# Patient Record
Sex: FEMALE | Race: BLACK OR AFRICAN AMERICAN | NOT HISPANIC OR LATINO
[De-identification: names, ages, dates, MRNs, and addresses within clinical notes are randomized per-mention and may not be internally consistent; named-entity substitution may affect disease eponyms.]

---

## 2018-01-31 PROBLEM — Z00.00 ENCOUNTER FOR PREVENTIVE HEALTH EXAMINATION: Status: ACTIVE | Noted: 2018-01-31

## 2018-02-08 ENCOUNTER — APPOINTMENT (OUTPATIENT)
Dept: GYNECOLOGIC ONCOLOGY | Facility: CLINIC | Age: 53
End: 2018-02-08

## 2018-02-22 ENCOUNTER — APPOINTMENT (OUTPATIENT)
Dept: GYNECOLOGIC ONCOLOGY | Facility: CLINIC | Age: 53
End: 2018-02-22

## 2018-03-12 ENCOUNTER — APPOINTMENT (OUTPATIENT)
Dept: GYNECOLOGIC ONCOLOGY | Facility: CLINIC | Age: 53
End: 2018-03-12
Payer: COMMERCIAL

## 2018-03-12 VITALS
HEART RATE: 80 BPM | SYSTOLIC BLOOD PRESSURE: 135 MMHG | DIASTOLIC BLOOD PRESSURE: 83 MMHG | BODY MASS INDEX: 32.39 KG/M2 | HEIGHT: 62 IN | WEIGHT: 176 LBS

## 2018-03-12 DIAGNOSIS — N83.201 UNSPECIFIED OVARIAN CYST, RIGHT SIDE: ICD-10-CM

## 2018-03-12 DIAGNOSIS — N92.0 EXCESSIVE AND FREQUENT MENSTRUATION WITH REGULAR CYCLE: ICD-10-CM

## 2018-03-12 DIAGNOSIS — Z80.42 FAMILY HISTORY OF MALIGNANT NEOPLASM OF PROSTATE: ICD-10-CM

## 2018-03-12 PROCEDURE — 99244 OFF/OP CNSLTJ NEW/EST MOD 40: CPT

## 2018-03-12 RX ORDER — LOSARTAN POTASSIUM AND HYDROCHLOROTHIAZIDE 12.5; 1 MG/1; MG/1
TABLET ORAL
Refills: 0 | Status: ACTIVE | COMMUNITY

## 2018-03-12 RX ORDER — FOLIC/MVI THER-MIN/LYCOP/LUT 1.25-2.5MG
TABLET ORAL
Refills: 0 | Status: ACTIVE | COMMUNITY

## 2018-03-12 RX ORDER — CARVEDILOL 3.12 MG/1
TABLET, FILM COATED ORAL
Refills: 0 | Status: ACTIVE | COMMUNITY

## 2018-06-21 ENCOUNTER — APPOINTMENT (OUTPATIENT)
Dept: GYNECOLOGIC ONCOLOGY | Facility: CLINIC | Age: 53
End: 2018-06-21

## 2018-11-13 ENCOUNTER — EMERGENCY (EMERGENCY)
Facility: HOSPITAL | Age: 53
LOS: 1 days | End: 2018-11-13
Attending: EMERGENCY MEDICINE
Payer: COMMERCIAL

## 2018-11-13 VITALS
TEMPERATURE: 99 F | DIASTOLIC BLOOD PRESSURE: 107 MMHG | HEIGHT: 62 IN | OXYGEN SATURATION: 97 % | SYSTOLIC BLOOD PRESSURE: 184 MMHG | HEART RATE: 83 BPM | WEIGHT: 175.05 LBS | RESPIRATION RATE: 20 BRPM

## 2018-11-13 VITALS
HEART RATE: 93 BPM | RESPIRATION RATE: 18 BRPM | DIASTOLIC BLOOD PRESSURE: 69 MMHG | TEMPERATURE: 98 F | SYSTOLIC BLOOD PRESSURE: 122 MMHG | OXYGEN SATURATION: 99 %

## 2018-11-13 LAB
ALBUMIN SERPL ELPH-MCNC: 3.8 G/DL — SIGNIFICANT CHANGE UP (ref 3.3–5)
ALP SERPL-CCNC: 102 U/L — SIGNIFICANT CHANGE UP (ref 40–120)
ALT FLD-CCNC: 11 U/L — SIGNIFICANT CHANGE UP (ref 10–45)
ANION GAP SERPL CALC-SCNC: 13 MMOL/L — SIGNIFICANT CHANGE UP (ref 5–17)
AST SERPL-CCNC: 17 U/L — SIGNIFICANT CHANGE UP (ref 10–40)
BASOPHILS # BLD AUTO: 0 K/UL — SIGNIFICANT CHANGE UP (ref 0–0.2)
BASOPHILS NFR BLD AUTO: 0.4 % — SIGNIFICANT CHANGE UP (ref 0–2)
BILIRUB SERPL-MCNC: 0.2 MG/DL — SIGNIFICANT CHANGE UP (ref 0.2–1.2)
BUN SERPL-MCNC: 8 MG/DL — SIGNIFICANT CHANGE UP (ref 7–23)
CALCIUM SERPL-MCNC: 9.4 MG/DL — SIGNIFICANT CHANGE UP (ref 8.4–10.5)
CHLORIDE SERPL-SCNC: 101 MMOL/L — SIGNIFICANT CHANGE UP (ref 96–108)
CO2 SERPL-SCNC: 26 MMOL/L — SIGNIFICANT CHANGE UP (ref 22–31)
CREAT SERPL-MCNC: 0.58 MG/DL — SIGNIFICANT CHANGE UP (ref 0.5–1.3)
EOSINOPHIL # BLD AUTO: 0.1 K/UL — SIGNIFICANT CHANGE UP (ref 0–0.5)
EOSINOPHIL NFR BLD AUTO: 1.6 % — SIGNIFICANT CHANGE UP (ref 0–6)
GLUCOSE SERPL-MCNC: 146 MG/DL — HIGH (ref 70–99)
HCT VFR BLD CALC: 30.5 % — LOW (ref 34.5–45)
HGB BLD-MCNC: 9.7 G/DL — LOW (ref 11.5–15.5)
LYMPHOCYTES # BLD AUTO: 1.4 K/UL — SIGNIFICANT CHANGE UP (ref 1–3.3)
LYMPHOCYTES # BLD AUTO: 32.7 % — SIGNIFICANT CHANGE UP (ref 13–44)
MCHC RBC-ENTMCNC: 26.4 PG — LOW (ref 27–34)
MCHC RBC-ENTMCNC: 31.8 GM/DL — LOW (ref 32–36)
MCV RBC AUTO: 83 FL — SIGNIFICANT CHANGE UP (ref 80–100)
MONOCYTES # BLD AUTO: 0.5 K/UL — SIGNIFICANT CHANGE UP (ref 0–0.9)
MONOCYTES NFR BLD AUTO: 12.1 % — SIGNIFICANT CHANGE UP (ref 2–14)
NEUTROPHILS # BLD AUTO: 2.3 K/UL — SIGNIFICANT CHANGE UP (ref 1.8–7.4)
NEUTROPHILS NFR BLD AUTO: 53.3 % — SIGNIFICANT CHANGE UP (ref 43–77)
PLATELET # BLD AUTO: 393 K/UL — SIGNIFICANT CHANGE UP (ref 150–400)
POTASSIUM SERPL-MCNC: 3.4 MMOL/L — LOW (ref 3.5–5.3)
POTASSIUM SERPL-SCNC: 3.4 MMOL/L — LOW (ref 3.5–5.3)
PROT SERPL-MCNC: 7.8 G/DL — SIGNIFICANT CHANGE UP (ref 6–8.3)
RBC # BLD: 3.67 M/UL — LOW (ref 3.8–5.2)
RBC # FLD: 14.5 % — SIGNIFICANT CHANGE UP (ref 10.3–14.5)
SODIUM SERPL-SCNC: 140 MMOL/L — SIGNIFICANT CHANGE UP (ref 135–145)
TROPONIN T, HIGH SENSITIVITY RESULT: <6 NG/L — SIGNIFICANT CHANGE UP (ref 0–51)
WBC # BLD: 4.3 K/UL — SIGNIFICANT CHANGE UP (ref 3.8–10.5)
WBC # FLD AUTO: 4.3 K/UL — SIGNIFICANT CHANGE UP (ref 3.8–10.5)

## 2018-11-13 PROCEDURE — 99284 EMERGENCY DEPT VISIT MOD MDM: CPT | Mod: 25

## 2018-11-13 PROCEDURE — 71046 X-RAY EXAM CHEST 2 VIEWS: CPT

## 2018-11-13 PROCEDURE — 71046 X-RAY EXAM CHEST 2 VIEWS: CPT | Mod: 26

## 2018-11-13 PROCEDURE — 93005 ELECTROCARDIOGRAM TRACING: CPT

## 2018-11-13 PROCEDURE — 70450 CT HEAD/BRAIN W/O DYE: CPT | Mod: 26

## 2018-11-13 PROCEDURE — 80053 COMPREHEN METABOLIC PANEL: CPT

## 2018-11-13 PROCEDURE — 84484 ASSAY OF TROPONIN QUANT: CPT

## 2018-11-13 PROCEDURE — 70450 CT HEAD/BRAIN W/O DYE: CPT

## 2018-11-13 PROCEDURE — 96374 THER/PROPH/DIAG INJ IV PUSH: CPT

## 2018-11-13 PROCEDURE — 85027 COMPLETE CBC AUTOMATED: CPT

## 2018-11-13 RX ORDER — ACETAMINOPHEN 500 MG
975 TABLET ORAL ONCE
Qty: 0 | Refills: 0 | Status: COMPLETED | OUTPATIENT
Start: 2018-11-13 | End: 2018-11-13

## 2018-11-13 RX ORDER — METOCLOPRAMIDE HCL 10 MG
10 TABLET ORAL ONCE
Qty: 0 | Refills: 0 | Status: COMPLETED | OUTPATIENT
Start: 2018-11-13 | End: 2018-11-13

## 2018-11-13 RX ORDER — DIPHENHYDRAMINE HCL 50 MG
25 CAPSULE ORAL ONCE
Qty: 0 | Refills: 0 | Status: COMPLETED | OUTPATIENT
Start: 2018-11-13 | End: 2018-11-13

## 2018-11-13 RX ORDER — SODIUM CHLORIDE 9 MG/ML
1000 INJECTION INTRAMUSCULAR; INTRAVENOUS; SUBCUTANEOUS ONCE
Qty: 0 | Refills: 0 | Status: COMPLETED | OUTPATIENT
Start: 2018-11-13 | End: 2018-11-13

## 2018-11-13 RX ADMIN — Medication 975 MILLIGRAM(S): at 04:07

## 2018-11-13 RX ADMIN — Medication 25 MILLIGRAM(S): at 04:07

## 2018-11-13 RX ADMIN — Medication 10 MILLIGRAM(S): at 04:06

## 2018-11-13 RX ADMIN — SODIUM CHLORIDE 2000 MILLILITER(S): 9 INJECTION INTRAMUSCULAR; INTRAVENOUS; SUBCUTANEOUS at 03:40

## 2018-11-13 NOTE — ED PROVIDER NOTE - CARE PLAN
Principal Discharge DX:	Headache  Assessment and plan of treatment:	1) You were here for headache.    2) Take tylenol for your pain.    3) Follow up with your primary doctor regarding your anemia and for further evaluation and to answer any questions you have.    4) Return to the emergency department if you experience worsening symptoms, pain, fever, chills, nausea, vomiting or other concerning symptoms'.

## 2018-11-13 NOTE — ED PROVIDER NOTE - ATTENDING CONTRIBUTION TO CARE
53 yof pmhx htn on carvedilol and arb/hctz, presents w gradual onset headache over last 3 days. not assoc w any vision changes, focal weaknesses, neck pain or fever. states ha is diffuse. no recent dietary indescretion or changes in her baseline medications.     ROS:   constitutional - no fever, no chills  eyes - no visual changes, no redness  eent - no sore throat, no nasal congestion  cvs - no chest pain, no leg swelling  resp - no shortness of breath, no cough  gi - no abdominal pain, no vomiting, no diarrhea  gu - no dysuria, no hematuria  msk - no acute back pain, no joint swelling  skin - no rashes, no jaundice  neuro - + headache, no focal weakness  psych - no acute mental health issue     Physical Exam:   constitutional - well appearing, awake and alert, oriented x3  head - no external evidence of trauma  cvs - rrr, no murmurs, no peripheral edema  resp - breath sounds clear and equal bilat  gi - abdomen soft and nontender, no rigidity, guarding or rebound, bowel sounds present  msk - moving all extremities spontaneously  neuro - alert and oriented x3, no focal deficits, CNs 2-12 grossly intact, strength 5/5 in all ext, gait stable, sensation to face intact, no meningismus   skin- no jaundice, warm and dry  psych - mood and affect wnl, no apparent risk to self or others     likely tension sarmiento. NYASIA Nielsen MD 53 yof pmhx htn on carvedilol and arb/hctz, presents w gradual onset headache over last 3 days. not assoc w any vision changes, focal weaknesses, neck pain or fever. states ha is diffuse. no recent dietary indescretion or changes in her baseline medications.     ROS:   constitutional - no fever, no chills  eyes - no visual changes, no redness  eent - no sore throat, no nasal congestion  cvs - no chest pain, no leg swelling  resp - no shortness of breath, no cough  gi - no abdominal pain, no vomiting, no diarrhea  gu - no dysuria, no hematuria  msk - no acute back pain, no joint swelling  skin - no rashes, no jaundice  neuro - + headache, no focal weakness  psych - no acute mental health issue     Physical Exam:   constitutional - well appearing, awake and alert, oriented x3  head - no external evidence of trauma  cvs - rrr, no murmurs, no peripheral edema  resp - breath sounds clear and equal bilat  gi - abdomen soft and nontender, no rigidity, guarding or rebound, bowel sounds present  msk - moving all extremities spontaneously  neuro - alert and oriented x3, no focal deficits, CNs 2-12 grossly intact, strength 5/5 in all ext, gait stable, sensation to face intact, no meningismus   skin- no jaundice, warm and dry  psych - mood and affect wnl, no apparent risk to self or others     likely tension ha. pt feeling better, ct neg. additional verbal instructions regarding diagnosis, return precautions and follow up plan given to pt and/or family. NYASIA Nielsen MD

## 2018-11-13 NOTE — ED PROVIDER NOTE - OBJECTIVE STATEMENT
53M with past medical history Hypertension not on medications presents with 2d h/o migratory throbbing headache a/w nausea and sensation of palpitations in heart.  Denies any inciting incident, no aggravating or alleviating factors and does not have h/o headaches.  Has been taking motrin for the headache which helps.  Denies focal weakness or loss of sensation, falls, vomiting, vision change, diaphoresis, arm pain, jaw pain, lightheadedness, fever, cough, dysuria, diarrhea, recent travel, calf pain.

## 2018-11-13 NOTE — ED ADULT NURSE NOTE - CHPI ED NUR SYMPTOMS NEG
no change in level of consciousness/no blurred vision/no confusion/no nausea/no numbness/no weakness/no fever/no loss of consciousness/no vomiting/no dizziness

## 2018-11-13 NOTE — ED PROVIDER NOTE - MEDICAL DECISION MAKING DETAILS
Low risk chest pain for 2d, will do single trop.  EKG to evaluation for arrythmia.  Will give headache cocktail and check labs.  Will obtain ct head given no h/o ha's and new onset ha's for 3d with nausea.

## 2018-11-13 NOTE — ED PROVIDER NOTE - NS ED ROS FT
Constitutional: no fever  Eyes: no conjunctivitis  Ears: no ear pain   Nose: no nasal congestion, Mouth/Throat: no throat pain, Neck: no stiffness  Cardiovascular: As noted in hpi   Chest: no cough  Gastrointestinal: no abdominal pain, no vomiting and diarrhea  MSK: no joint pain  : no dysuria  Skin: no rash  Neuro: As noted in hpi   All other review of systems negative except as noted in HPI

## 2018-11-13 NOTE — ED PROVIDER NOTE - PROGRESS NOTE DETAILS
ct negative, xray negative, ekg unremarkable, headache improved.  headache likely tension in nature.  will discharge with fu with her primary medical doctor for anemia.

## 2020-09-12 ENCOUNTER — EMERGENCY (EMERGENCY)
Facility: HOSPITAL | Age: 55
LOS: 1 days | Discharge: HOME | End: 2020-09-12
Attending: EMERGENCY MEDICINE
Payer: COMMERCIAL

## 2020-09-12 VITALS
SYSTOLIC BLOOD PRESSURE: 167 MMHG | HEIGHT: 62 IN | TEMPERATURE: 99 F | DIASTOLIC BLOOD PRESSURE: 83 MMHG | HEART RATE: 91 BPM | RESPIRATION RATE: 17 BRPM | WEIGHT: 169.98 LBS | OXYGEN SATURATION: 98 %

## 2020-09-12 VITALS
DIASTOLIC BLOOD PRESSURE: 76 MMHG | RESPIRATION RATE: 16 BRPM | TEMPERATURE: 98 F | OXYGEN SATURATION: 100 % | HEART RATE: 82 BPM | SYSTOLIC BLOOD PRESSURE: 129 MMHG

## 2020-09-12 LAB
ALBUMIN SERPL ELPH-MCNC: 4.1 G/DL — SIGNIFICANT CHANGE UP (ref 3.3–5)
ALP SERPL-CCNC: 113 U/L — SIGNIFICANT CHANGE UP (ref 40–120)
ALT FLD-CCNC: 19 U/L — SIGNIFICANT CHANGE UP (ref 10–45)
ANION GAP SERPL CALC-SCNC: 12 MMOL/L — SIGNIFICANT CHANGE UP (ref 5–17)
APTT BLD: 37.8 SEC — HIGH (ref 27.5–35.5)
AST SERPL-CCNC: 22 U/L — SIGNIFICANT CHANGE UP (ref 10–40)
BILIRUB SERPL-MCNC: 0.6 MG/DL — SIGNIFICANT CHANGE UP (ref 0.2–1.2)
BUN SERPL-MCNC: 7 MG/DL — SIGNIFICANT CHANGE UP (ref 7–23)
CALCIUM SERPL-MCNC: 9.9 MG/DL — SIGNIFICANT CHANGE UP (ref 8.4–10.5)
CHLORIDE SERPL-SCNC: 99 MMOL/L — SIGNIFICANT CHANGE UP (ref 96–108)
CO2 SERPL-SCNC: 28 MMOL/L — SIGNIFICANT CHANGE UP (ref 22–31)
CREAT SERPL-MCNC: 0.57 MG/DL — SIGNIFICANT CHANGE UP (ref 0.5–1.3)
GLUCOSE SERPL-MCNC: 109 MG/DL — HIGH (ref 70–99)
HCG SERPL-ACNC: 2.2 MIU/ML — SIGNIFICANT CHANGE UP
HCT VFR BLD CALC: 41.7 % — SIGNIFICANT CHANGE UP (ref 34.5–45)
HGB BLD-MCNC: 13.1 G/DL — SIGNIFICANT CHANGE UP (ref 11.5–15.5)
HIV 1 & 2 AB SERPL IA.RAPID: SIGNIFICANT CHANGE UP
INR BLD: 1.12 RATIO — SIGNIFICANT CHANGE UP (ref 0.88–1.16)
MCHC RBC-ENTMCNC: 27 PG — SIGNIFICANT CHANGE UP (ref 27–34)
MCHC RBC-ENTMCNC: 31.4 GM/DL — LOW (ref 32–36)
MCV RBC AUTO: 86 FL — SIGNIFICANT CHANGE UP (ref 80–100)
NRBC # BLD: 0 /100 WBCS — SIGNIFICANT CHANGE UP (ref 0–0)
PLATELET # BLD AUTO: 250 K/UL — SIGNIFICANT CHANGE UP (ref 150–400)
POTASSIUM SERPL-MCNC: 3.4 MMOL/L — LOW (ref 3.5–5.3)
POTASSIUM SERPL-SCNC: 3.4 MMOL/L — LOW (ref 3.5–5.3)
PROT SERPL-MCNC: 8.6 G/DL — HIGH (ref 6–8.3)
PROTHROM AB SERPL-ACNC: 13.2 SEC — SIGNIFICANT CHANGE UP (ref 10.6–13.6)
RBC # BLD: 4.85 M/UL — SIGNIFICANT CHANGE UP (ref 3.8–5.2)
RBC # FLD: 14.1 % — SIGNIFICANT CHANGE UP (ref 10.3–14.5)
SODIUM SERPL-SCNC: 139 MMOL/L — SIGNIFICANT CHANGE UP (ref 135–145)
WBC # BLD: 2.89 K/UL — LOW (ref 3.8–10.5)
WBC # FLD AUTO: 2.89 K/UL — LOW (ref 3.8–10.5)

## 2020-09-12 PROCEDURE — 80053 COMPREHEN METABOLIC PANEL: CPT

## 2020-09-12 PROCEDURE — 85027 COMPLETE CBC AUTOMATED: CPT

## 2020-09-12 PROCEDURE — 99285 EMERGENCY DEPT VISIT HI MDM: CPT

## 2020-09-12 PROCEDURE — 70487 CT MAXILLOFACIAL W/DYE: CPT

## 2020-09-12 PROCEDURE — 70487 CT MAXILLOFACIAL W/DYE: CPT | Mod: 26

## 2020-09-12 PROCEDURE — 86703 HIV-1/HIV-2 1 RESULT ANTBDY: CPT

## 2020-09-12 PROCEDURE — 85730 THROMBOPLASTIN TIME PARTIAL: CPT

## 2020-09-12 PROCEDURE — 84702 CHORIONIC GONADOTROPIN TEST: CPT

## 2020-09-12 PROCEDURE — 99284 EMERGENCY DEPT VISIT MOD MDM: CPT | Mod: 25

## 2020-09-12 PROCEDURE — 85610 PROTHROMBIN TIME: CPT

## 2020-09-12 RX ADMIN — Medication 1 TABLET(S): at 17:29

## 2020-09-12 NOTE — ED PROVIDER NOTE - OBJECTIVE STATEMENT
54 yoM, PMHx of HTN, controlled, presenting with 2-3 days of L sided facial numbness. Denies any fever, chills, trauma. No pain. No motor changes, droop, changes in smile, voice, swallowing. No visual changes. No recent illness. Denies any odontogenic complaint. Never had this sx before. Denies any insect bites. Sx constant, slightly worse. Sees PCP regularly. Denies any smoking, drinking, drug use.

## 2020-09-12 NOTE — ED PROVIDER NOTE - NSFOLLOWUPINSTRUCTIONS_ED_ALL_ED_FT
You will be treated for an early infection.     Take augmentin as prescribed.     Follow up with your doctor THIS WEEK.    Please return to ER for new or concerning symptoms.

## 2020-09-12 NOTE — ED PROVIDER NOTE - PATIENT PORTAL LINK FT
You can access the FollowMyHealth Patient Portal offered by [facility] by registering at the following website: [email or web address]. By joining [employer]’s FollowMyHealth portal, you will also be able to view your health information using other applications (apps) compatible with our system.

## 2020-09-12 NOTE — ED ADULT NURSE NOTE - OBJECTIVE STATEMENT
55 yo f pmhx of HTN, presents to the ED with c/o numbness to the left upper cheek which started 3 days ago. PT reports the numbness started three days ago, has progressively gotten worse. Pt reports she noticed also a subtle change, reports slight swelling. Pt AAOx4, no confusion, neurologically intact, PERRLA 3mm, no blurry vision, no facial droop, reports slight decreased sensation of the left cheek,  NAD, resp nonlabored, abdomen soft nontender nondistended, pt following commands, Rigoberto independently, 5/5 equal strength to extremities x 4, no sensory deficits, strong peripheral pulses x 4, cap refill < 2 seconds, skin warm and dry, ambulating independently, steady gait noted. Pt denies headache, dizziness, chest pain, palpitations, cough, SOB, abdominal pain, n/v/d, urinary symptoms, fevers, chills, weakness at this time. Reports no recent bug bites, change in detergent, or medications.

## 2020-09-12 NOTE — ED ADULT NURSE NOTE - CHIEF COMPLAINT QUOTE
L sided facial numbness x2-3 days. Endorses headache. No changes in speech. Strength intact, no change in gait.  PMHx HTN

## 2020-09-12 NOTE — ED PROVIDER NOTE - ATTENDING CONTRIBUTION TO CARE
55 y/o m with pmhx HTN, presents for left side of her face that began on Wednesday and has persisted. Today the numbness sensation increased on her cheek and came to ED for eval. no associated headache. no facial motor weakness. no change in vision or hearing. no difficulty with speech. no pain on ear.   Gen.  no acute distress  HEENT:  perrl eomi, no skin changes. no ttp. tm clear b/l, pupils 2 mm reactive to light b/l. tongue no jamar or erythema. sinus no ttp.   Lungs:  b/l b bs  CVS: S1S2   Abd;  soft non tender no distention  Ext: no pitting edema or erythema  Neuro: aaox3 no focal deficits  MSK: strength 5/5 b/l upper and lower ext.

## 2020-09-12 NOTE — ED PROVIDER NOTE - PROGRESS NOTE DETAILS
Haverty, PGY3- mild swelling on CT, no evidence of abscess/cellulitis. Treat for early soft tissue infection. Augmentin ERX sent. Pt understands and amenable to plan.

## 2020-09-12 NOTE — ED PROVIDER NOTE - PHYSICAL EXAMINATION
General: alert, conversant, looks well, vitals reassuring  Head: atraumatic, normocephalic  Eyes: PERRL, EOMI, no scleral icterus  ENT: no epistaxis, moist mucous membranes, normal phonation, airway patent, very mild facial edema L side around, no erythema, skin intact, no induration/fluctuance, no dental/oral abnormalities/caries/lesions seen  Neck: full ROM, no midline ttp  CV: RRR, no murmurs, HDS  Pulm: lungs CTA b/l, no wheezing, no respiratory distress  GI: abd soft, non tender, no guarding/rebound/masses  Back: normal ROM, no midline ttp, no signs of trauma  Extremities: normal ROM, joints stable, distal pulses intact, no edema  Neuro: alert, oriented x3, moving all extremities, interactive. CN 2-12 intact, PERRL, 5/5 strength in extremities, ambulatory, normal speech/memory, no nystagmus, no facial droop, sensation to affected area intact, but subjective decreased sensation  Derm: warm, dry, normal color, no rash/wounds

## 2021-03-22 ENCOUNTER — OUTPATIENT (OUTPATIENT)
Dept: OUTPATIENT SERVICES | Facility: HOSPITAL | Age: 56
LOS: 1 days | End: 2021-03-22
Payer: COMMERCIAL

## 2021-03-22 VITALS
OXYGEN SATURATION: 100 % | DIASTOLIC BLOOD PRESSURE: 98 MMHG | WEIGHT: 173.06 LBS | SYSTOLIC BLOOD PRESSURE: 150 MMHG | HEIGHT: 62 IN | RESPIRATION RATE: 16 BRPM | HEART RATE: 78 BPM | TEMPERATURE: 98 F

## 2021-03-22 DIAGNOSIS — N84.0 POLYP OF CORPUS UTERI: ICD-10-CM

## 2021-03-22 DIAGNOSIS — Z01.818 ENCOUNTER FOR OTHER PREPROCEDURAL EXAMINATION: ICD-10-CM

## 2021-03-22 DIAGNOSIS — I10 ESSENTIAL (PRIMARY) HYPERTENSION: ICD-10-CM

## 2021-03-22 DIAGNOSIS — Z29.9 ENCOUNTER FOR PROPHYLACTIC MEASURES, UNSPECIFIED: ICD-10-CM

## 2021-03-22 DIAGNOSIS — Z98.891 HISTORY OF UTERINE SCAR FROM PREVIOUS SURGERY: Chronic | ICD-10-CM

## 2021-03-22 LAB
ANION GAP SERPL CALC-SCNC: 5 MMOL/L — SIGNIFICANT CHANGE UP (ref 5–17)
APTT BLD: 40 SEC — HIGH (ref 27.5–35.5)
BLD GP AB SCN SERPL QL: SIGNIFICANT CHANGE UP
BUN SERPL-MCNC: 9 MG/DL — SIGNIFICANT CHANGE UP (ref 7–18)
CALCIUM SERPL-MCNC: 9.1 MG/DL — SIGNIFICANT CHANGE UP (ref 8.4–10.5)
CHLORIDE SERPL-SCNC: 103 MMOL/L — SIGNIFICANT CHANGE UP (ref 96–108)
CO2 SERPL-SCNC: 32 MMOL/L — HIGH (ref 22–31)
CREAT SERPL-MCNC: 0.68 MG/DL — SIGNIFICANT CHANGE UP (ref 0.5–1.3)
GLUCOSE SERPL-MCNC: 92 MG/DL — SIGNIFICANT CHANGE UP (ref 70–99)
HCT VFR BLD CALC: 38.2 % — SIGNIFICANT CHANGE UP (ref 34.5–45)
HGB BLD-MCNC: 11.8 G/DL — SIGNIFICANT CHANGE UP (ref 11.5–15.5)
INR BLD: 1.15 RATIO — SIGNIFICANT CHANGE UP (ref 0.88–1.16)
MCHC RBC-ENTMCNC: 26.6 PG — LOW (ref 27–34)
MCHC RBC-ENTMCNC: 30.9 GM/DL — LOW (ref 32–36)
MCV RBC AUTO: 86.2 FL — SIGNIFICANT CHANGE UP (ref 80–100)
NRBC # BLD: 0 /100 WBCS — SIGNIFICANT CHANGE UP (ref 0–0)
PLATELET # BLD AUTO: 256 K/UL — SIGNIFICANT CHANGE UP (ref 150–400)
POTASSIUM SERPL-MCNC: 3.5 MMOL/L — SIGNIFICANT CHANGE UP (ref 3.5–5.3)
POTASSIUM SERPL-SCNC: 3.5 MMOL/L — SIGNIFICANT CHANGE UP (ref 3.5–5.3)
PROTHROM AB SERPL-ACNC: 13.6 SEC — SIGNIFICANT CHANGE UP (ref 10.6–13.6)
RBC # BLD: 4.43 M/UL — SIGNIFICANT CHANGE UP (ref 3.8–5.2)
RBC # FLD: 14.3 % — SIGNIFICANT CHANGE UP (ref 10.3–14.5)
SODIUM SERPL-SCNC: 140 MMOL/L — SIGNIFICANT CHANGE UP (ref 135–145)
WBC # BLD: 2.3 K/UL — LOW (ref 3.8–10.5)
WBC # FLD AUTO: 2.3 K/UL — LOW (ref 3.8–10.5)

## 2021-03-22 PROCEDURE — 93005 ELECTROCARDIOGRAM TRACING: CPT

## 2021-03-22 PROCEDURE — G0463: CPT

## 2021-03-22 NOTE — H&P PST ADULT - HISTORY OF PRESENT ILLNESS
55 years old female presented to [facility] for evaluation before surgery, patient is diagnosed with polyp of corpus uteri and is scheduled for dilation and curettage with hysteroscopy on 4/2/2021.

## 2021-03-22 NOTE — H&P PST ADULT - NEGATIVE NEUROLOGICAL SYMPTOMS
no transient paralysis/no weakness/no paresthesias/no generalized seizures/no tremors/no vertigo/no loss of sensation/no difficulty walking/no headache/no loss of consciousness

## 2021-03-22 NOTE — H&P PST ADULT - NEGATIVE GENERAL GENITOURINARY SYMPTOMS
no hematuria/no renal colic/no flank pain L/no flank pain R/no incontinence/no dysuria/no urinary hesitancy/normal urinary frequency/no nocturia

## 2021-03-22 NOTE — H&P PST ADULT - BIRTH SEX
CERTIFICATE OF WORK    6/8/2020        Ariane Rodriguez   [facility] [id number]        This is to certify that Ariane Rodriguez has been under my care from 6/8/2020 at 6:50 PM and must wear walking boot while at work.      SIGNATURE:___________________________________________,     Fito Winn, [facility]  [address] [place] [address] [phone]       
Female

## 2021-03-22 NOTE — H&P PST ADULT - BP NONINVASIVE DIASTOLIC (MM HG)
Patient's wife calling requesting a call back to discuss the patient's procedure for 04/01/19. She states that she is pretty sure the patient is not going to be able to have that done then. She is wondering if the patient can have labs done, and if they look good, the patient can have another nasal swab. She states that she is shooting to have the procedure on 04/29/19.    Thank you   98

## 2021-03-22 NOTE — H&P PST ADULT - NSICDXPROBLEM_GEN_ALL_CORE_FT
PROBLEM DIAGNOSES  Problem: Hypertension  Assessment and Plan: Continue antihypertensive meds and take with sips of water on day of surgery.   Follow-up with PCP postop for management.      Problem: Polyp of corpus uteri  Assessment and Plan: Patient is scheduled for dilation and curettage with hysteroscopy on 4/2/2021.     Problem: Prophylactic measure  Assessment and Plan: Preoperative instructions discussed with pt and given to pt. Instructed pt that no one will be allowed to come to hospital with patient , to notify security on arrival to the lobby of the hospital that today is day of surgery. Patient notified that will need someone to come to the hospital to  after surgery, not to eat or drink anything after midnight the night before the surgery, to avoid NSAIDs such as Ibuprofen, Motrin, Aleve, Advil, naproxen before surgery, to take Tylenol if needed for pain if no contraindications from PCP, to report exposure to anyone with any contagious diseases including Covid-19 or if patient is exhibiting any symptoms of COVID-19. Chlorhexidine 4% soap given to pt. Instructed about use of Chlorhexidine 4% soap before surgery. Patient verbalized understanding of instructions given.

## 2021-03-24 PROBLEM — I10 ESSENTIAL (PRIMARY) HYPERTENSION: Chronic | Status: ACTIVE | Noted: 2021-03-22

## 2021-03-24 PROBLEM — N84.0 POLYP OF CORPUS UTERI: Chronic | Status: ACTIVE | Noted: 2021-03-22

## 2021-03-24 PROBLEM — N95.0 POSTMENOPAUSAL BLEEDING: Chronic | Status: ACTIVE | Noted: 2021-03-22

## 2021-03-29 DIAGNOSIS — Z01.818 ENCOUNTER FOR OTHER PREPROCEDURAL EXAMINATION: ICD-10-CM

## 2021-03-30 ENCOUNTER — APPOINTMENT (OUTPATIENT)
Dept: DISASTER EMERGENCY | Facility: CLINIC | Age: 56
End: 2021-03-30

## 2021-03-31 LAB — SARS-COV-2 N GENE NPH QL NAA+PROBE: NOT DETECTED

## 2021-04-01 ENCOUNTER — TRANSCRIPTION ENCOUNTER (OUTPATIENT)
Age: 56
End: 2021-04-01

## 2021-04-02 ENCOUNTER — RESULT REVIEW (OUTPATIENT)
Age: 56
End: 2021-04-02

## 2021-04-02 ENCOUNTER — OUTPATIENT (OUTPATIENT)
Dept: OUTPATIENT SERVICES | Facility: HOSPITAL | Age: 56
LOS: 1 days | End: 2021-04-02
Payer: COMMERCIAL

## 2021-04-02 VITALS
OXYGEN SATURATION: 97 % | DIASTOLIC BLOOD PRESSURE: 76 MMHG | TEMPERATURE: 98 F | SYSTOLIC BLOOD PRESSURE: 110 MMHG | HEART RATE: 69 BPM | RESPIRATION RATE: 17 BRPM

## 2021-04-02 VITALS
OXYGEN SATURATION: 98 % | TEMPERATURE: 99 F | SYSTOLIC BLOOD PRESSURE: 134 MMHG | WEIGHT: 173.06 LBS | DIASTOLIC BLOOD PRESSURE: 80 MMHG | RESPIRATION RATE: 16 BRPM | HEIGHT: 62 IN | HEART RATE: 94 BPM

## 2021-04-02 DIAGNOSIS — Z98.891 HISTORY OF UTERINE SCAR FROM PREVIOUS SURGERY: Chronic | ICD-10-CM

## 2021-04-02 DIAGNOSIS — N84.0 POLYP OF CORPUS UTERI: ICD-10-CM

## 2021-04-02 LAB
BLD GP AB SCN SERPL QL: SIGNIFICANT CHANGE UP
HCG UR QL: NEGATIVE — SIGNIFICANT CHANGE UP
HCT VFR BLD CALC: 38.3 % — SIGNIFICANT CHANGE UP (ref 34.5–45)
HGB BLD-MCNC: 12 G/DL — SIGNIFICANT CHANGE UP (ref 11.5–15.5)
MCHC RBC-ENTMCNC: 26.7 PG — LOW (ref 27–34)
MCHC RBC-ENTMCNC: 31.3 GM/DL — LOW (ref 32–36)
MCV RBC AUTO: 85.1 FL — SIGNIFICANT CHANGE UP (ref 80–100)
NRBC # BLD: 0 /100 WBCS — SIGNIFICANT CHANGE UP (ref 0–0)
PLATELET # BLD AUTO: 314 K/UL — SIGNIFICANT CHANGE UP (ref 150–400)
RBC # BLD: 4.5 M/UL — SIGNIFICANT CHANGE UP (ref 3.8–5.2)
RBC # FLD: 13.9 % — SIGNIFICANT CHANGE UP (ref 10.3–14.5)
WBC # BLD: 2.73 K/UL — LOW (ref 3.8–10.5)
WBC # FLD AUTO: 2.73 K/UL — LOW (ref 3.8–10.5)

## 2021-04-02 PROCEDURE — 86850 RBC ANTIBODY SCREEN: CPT

## 2021-04-02 PROCEDURE — 86900 BLOOD TYPING SEROLOGIC ABO: CPT

## 2021-04-02 PROCEDURE — 81025 URINE PREGNANCY TEST: CPT

## 2021-04-02 PROCEDURE — 88305 TISSUE EXAM BY PATHOLOGIST: CPT | Mod: 26

## 2021-04-02 PROCEDURE — 88305 TISSUE EXAM BY PATHOLOGIST: CPT

## 2021-04-02 PROCEDURE — 36415 COLL VENOUS BLD VENIPUNCTURE: CPT

## 2021-04-02 PROCEDURE — 58558 HYSTEROSCOPY BIOPSY: CPT

## 2021-04-02 PROCEDURE — 85027 COMPLETE CBC AUTOMATED: CPT

## 2021-04-02 PROCEDURE — 86901 BLOOD TYPING SEROLOGIC RH(D): CPT

## 2021-04-02 RX ORDER — ONDANSETRON 8 MG/1
4 TABLET, FILM COATED ORAL ONCE
Refills: 0 | Status: DISCONTINUED | OUTPATIENT
Start: 2021-04-02 | End: 2021-04-09

## 2021-04-02 RX ORDER — SODIUM CHLORIDE 9 MG/ML
3 INJECTION INTRAMUSCULAR; INTRAVENOUS; SUBCUTANEOUS EVERY 8 HOURS
Refills: 0 | Status: DISCONTINUED | OUTPATIENT
Start: 2021-04-02 | End: 2021-04-02

## 2021-04-02 RX ORDER — IBUPROFEN 200 MG
600 TABLET ORAL EVERY 6 HOURS
Refills: 0 | Status: DISCONTINUED | OUTPATIENT
Start: 2021-04-02 | End: 2021-04-09

## 2021-04-02 RX ORDER — CARVEDILOL PHOSPHATE 80 MG/1
1 CAPSULE, EXTENDED RELEASE ORAL
Qty: 0 | Refills: 0 | DISCHARGE

## 2021-04-02 RX ORDER — IBUPROFEN 200 MG
1 TABLET ORAL
Qty: 40 | Refills: 2
Start: 2021-04-02 | End: 2021-05-01

## 2021-04-02 RX ADMIN — SODIUM CHLORIDE 3 MILLILITER(S): 9 INJECTION INTRAMUSCULAR; INTRAVENOUS; SUBCUTANEOUS at 07:07

## 2021-04-02 NOTE — ASU DISCHARGE PLAN (ADULT/PEDIATRIC) - CARE PROVIDER_API CALL
Muna Hahn)  Obstetrics and Gynecology  [address] [place] [address]  Phone: [phone]  Fax: [phone]  Follow Up Time:

## 2021-04-02 NOTE — ASU DISCHARGE PLAN (ADULT/PEDIATRIC) - CALL YOUR DOCTOR IF YOU HAVE ANY OF THE FOLLOWING:
Bleeding that does not stop/Pain not relieved by Medications/Fever greater than (need to indicate Fahrenheit or Celsius)/Unable to urinate/Inability to tolerate liquids or foods/Increased irritability or sluggishness

## 2021-04-06 LAB — SURGICAL PATHOLOGY STUDY: SIGNIFICANT CHANGE UP

## 2021-10-02 ENCOUNTER — EMERGENCY (EMERGENCY)
Facility: HOSPITAL | Age: 56
LOS: 1 days | Discharge: HOME | End: 2021-10-02
Attending: EMERGENCY MEDICINE
Payer: COMMERCIAL

## 2021-10-02 VITALS
DIASTOLIC BLOOD PRESSURE: 96 MMHG | OXYGEN SATURATION: 97 % | SYSTOLIC BLOOD PRESSURE: 149 MMHG | TEMPERATURE: 98 F | RESPIRATION RATE: 18 BRPM | WEIGHT: 175.05 LBS | HEART RATE: 82 BPM | HEIGHT: 62 IN

## 2021-10-02 VITALS
DIASTOLIC BLOOD PRESSURE: 78 MMHG | OXYGEN SATURATION: 98 % | SYSTOLIC BLOOD PRESSURE: 134 MMHG | HEART RATE: 76 BPM | TEMPERATURE: 98 F | RESPIRATION RATE: 18 BRPM

## 2021-10-02 DIAGNOSIS — Z98.891 HISTORY OF UTERINE SCAR FROM PREVIOUS SURGERY: Chronic | ICD-10-CM

## 2021-10-02 LAB
APPEARANCE UR: CLEAR — SIGNIFICANT CHANGE UP
BACTERIA # UR AUTO: ABNORMAL
BILIRUB UR-MCNC: NEGATIVE — SIGNIFICANT CHANGE UP
COLOR SPEC: SIGNIFICANT CHANGE UP
DIFF PNL FLD: NEGATIVE — SIGNIFICANT CHANGE UP
EPI CELLS # UR: 8 /HPF — HIGH
GLUCOSE UR QL: NEGATIVE — SIGNIFICANT CHANGE UP
HYALINE CASTS # UR AUTO: 2 /LPF — SIGNIFICANT CHANGE UP (ref 0–2)
KETONES UR-MCNC: NEGATIVE — SIGNIFICANT CHANGE UP
LEUKOCYTE ESTERASE UR-ACNC: ABNORMAL
NITRITE UR-MCNC: NEGATIVE — SIGNIFICANT CHANGE UP
PH UR: 7 — SIGNIFICANT CHANGE UP (ref 5–8)
PROT UR-MCNC: NEGATIVE — SIGNIFICANT CHANGE UP
RBC CASTS # UR COMP ASSIST: 1 /HPF — SIGNIFICANT CHANGE UP (ref 0–4)
SP GR SPEC: 1.02 — SIGNIFICANT CHANGE UP (ref 1.01–1.02)
UROBILINOGEN FLD QL: ABNORMAL
WBC UR QL: 4 /HPF — SIGNIFICANT CHANGE UP (ref 0–5)

## 2021-10-02 PROCEDURE — 81001 URINALYSIS AUTO W/SCOPE: CPT

## 2021-10-02 PROCEDURE — 99283 EMERGENCY DEPT VISIT LOW MDM: CPT

## 2021-10-02 PROCEDURE — 87086 URINE CULTURE/COLONY COUNT: CPT

## 2021-10-02 PROCEDURE — 99284 EMERGENCY DEPT VISIT MOD MDM: CPT

## 2021-10-02 RX ORDER — LIDOCAINE 4 G/100G
1 CREAM TOPICAL ONCE
Refills: 0 | Status: COMPLETED | OUTPATIENT
Start: 2021-10-02 | End: 2021-10-02

## 2021-10-02 RX ORDER — IBUPROFEN 200 MG
600 TABLET ORAL ONCE
Refills: 0 | Status: COMPLETED | OUTPATIENT
Start: 2021-10-02 | End: 2021-10-02

## 2021-10-02 RX ADMIN — Medication 600 MILLIGRAM(S): at 20:01

## 2021-10-02 RX ADMIN — LIDOCAINE 1 PATCH: 4 CREAM TOPICAL at 20:01

## 2021-10-02 NOTE — ED PROVIDER NOTE - PATIENT PORTAL LINK FT
You can access the FollowMyHealth Patient Portal offered by [facility] by registering at the following website: [email or web address]. By joining [employer]’s FollowMyHealth portal, you will also be able to view your health information using other applications (apps) compatible with our system.

## 2021-10-02 NOTE — ED PROVIDER NOTE - ATTENDING CONTRIBUTION TO CARE
56 yo female with low back pain radiating to flank after sleeping in an "awkward" position per patient.  Pain worse with movement, improved with rest.  No dysuria, no hematuria.  UA unremarkable.  No CP/SOB.  Not dissection, not PE, not renal colic.  Very likely benign MSK injury vs atypical radiculopathy though I think the latter is less likely.  Otherwise quite well appearing, smiling, conversant.  Stable for outpatient follow-up.

## 2021-10-02 NOTE — ED PROVIDER NOTE - NSFOLLOWUPINSTRUCTIONS_ED_ALL_ED_FT
1. Stay hydrated. Recommend apply ice to your back in 20 minute intervals for the next few days     2. Avoid strenuous activity, twisting and heavy lifting    3. For residual pain, recommend taking over the counter Tylenol 1g every 8 hours alternated with Motrin 600 mg every 6hrs     4. Follow up with your Primary Care Doctor after discharge for reevaluation     5. Return to ED for worsening of symptoms including increased pain, weakness, numbness, change in bowel/urine function and all other concerns

## 2021-10-02 NOTE — ED ADULT NURSE NOTE - CADM POA URETHRAL CATHETER
Medication History completed:    New medications: Suboxone films    Medications discontinued:    Changes to dosing:    Stated allergies: As listed    Other pertinent information: Medications confirmed with [address] and [facility]. OARRS history reviewed.      Thank you,  Shelia Gutierrez, PharmD, BCPS  [phone] No

## 2021-10-02 NOTE — ED PROVIDER NOTE - PROGRESS NOTE DETAILS
UA without RBC. Clinically does not present as a kidney stone. pt w/out dysuria, urinary freq or urgency. Likely msk pain. Pt with some relief after nsaid and lido in ED. Will encourage rest and conservative management with close PCP follow up   Piedad Garcia PA-C

## 2021-10-02 NOTE — ED ADULT TRIAGE NOTE - PAIN RATING/NUMBER SCALE (0-10): REST
8 Telephone Encounter by Tunde Aguilera MD at 10/23/18 12:50 PM     Author:  Tunde Aguilera MD Service:  (none) Author Type:  Physician     Filed:  10/23/18 12:52 PM Encounter Date:  10/23/2018 Status:  Signed     :  Tunde Aguilera MD (Physician)            I have not seen the patient in three years.    She has not had a mammogram in over four years.  She is overdue for blood work.  Is she still taking her meds (perhaps cardiology is filling them)>     She needs a follow up visit with me asap.  We can discuss / write the letter at that visit.  I would have to evaluate her hernia as well.[RH1.1M]       Revision History        User Key Date/Time User Provider Type Action    > RH1.1 10/23/18 12:52 PM Tunde Aguilera MD Physician Sign    M - Manual

## 2021-10-02 NOTE — ED ADULT NURSE NOTE - OBJECTIVE STATEMENT
Pt is a 55 yr old female with pmh of HTN coming from home for left sided flank pain. Pt states starting Thursday she had left flank pain with some radiation down into her left groin. Pt denies any blood in her urine, frequency, or burning on urination- also denies fevers, headaches, cough, or sob. Pt has been taking two Tylenol for the pain. Pt states the pain comes and goes. Pt is a/ox 3- vitals stable.

## 2021-10-02 NOTE — ED PROVIDER NOTE - PHYSICAL EXAMINATION
CONSTITUTIONAL: Patient is awake, alert and oriented x 3. Patient is well appearing and in no acute distress  HEAD: NCAT  NECK: supple, FROM  LUNGS: CTA B/L  HEART: RRR  ABDOMEN: Soft nd, nttp, no rebound or guarding, no cvat b/l   EXTREMITY: no edema or calf tenderness b/l, FROM upper and lower ext b/l  SKIN: with no rash or lesions  NEURO: No focal deficits

## 2021-10-02 NOTE — ED ADULT TRIAGE NOTE - ESI TRIAGE ACUITY LEVEL, MLM
3

## 2021-10-03 LAB
CULTURE RESULTS: SIGNIFICANT CHANGE UP
SPECIMEN SOURCE: SIGNIFICANT CHANGE UP

## 2021-12-21 NOTE — ASU PREOP CHECKLIST - SELECT TESTS ORDERED
Home
CBC, UCG, T&S sent stat preop as per NP order (drawn by YUNI Helms)/BMP/CBC/PT/PTT/INR/Type and Screen/UCG/EKG/Results in MD note/COVID-19

## 2022-10-06 NOTE — H&P PST ADULT - ALLERGIC/IMMUNOLOGIC
What Type Of Note Output Would You Prefer (Optional)?: Standard Output What Is The Reason For Today's Visit?: Full Body Skin Examination What Is The Reason For Today's Visit? (Being Monitored For X): the development of new lesions How Severe Are Your Spot(S)?: moderate negative

## 2022-12-13 NOTE — ASU PATIENT PROFILE, ADULT - PATIENT KNOW
Miranda with [employer] called and stated she was in need of providers last note on patient so they are able to keep patient enrolled.     Writer called Miranda back today to get the fax number to send providers last notes for patient.     Writer will fax notes to [phone]    Miranda's number is [phone]    yes

## 2023-03-06 ENCOUNTER — EMERGENCY (EMERGENCY)
Facility: HOSPITAL | Age: 58
LOS: 1 days | Discharge: HOME | End: 2023-03-06
Attending: EMERGENCY MEDICINE | Admitting: EMERGENCY MEDICINE
Payer: COMMERCIAL

## 2023-03-06 VITALS
HEART RATE: 88 BPM | RESPIRATION RATE: 17 BRPM | OXYGEN SATURATION: 99 % | SYSTOLIC BLOOD PRESSURE: 154 MMHG | DIASTOLIC BLOOD PRESSURE: 94 MMHG | TEMPERATURE: 99 F

## 2023-03-06 VITALS
OXYGEN SATURATION: 98 % | SYSTOLIC BLOOD PRESSURE: 147 MMHG | TEMPERATURE: 99 F | DIASTOLIC BLOOD PRESSURE: 94 MMHG | RESPIRATION RATE: 18 BRPM | HEART RATE: 86 BPM

## 2023-03-06 DIAGNOSIS — Z98.891 HISTORY OF UTERINE SCAR FROM PREVIOUS SURGERY: Chronic | ICD-10-CM

## 2023-03-06 PROCEDURE — 99284 EMERGENCY DEPT VISIT MOD MDM: CPT

## 2023-03-06 RX ORDER — VALACYCLOVIR 500 MG/1
2 TABLET, FILM COATED ORAL
Qty: 42 | Refills: 0
Start: 2023-03-06 | End: 2023-03-12

## 2023-03-06 RX ORDER — VALACYCLOVIR 500 MG/1
1000 TABLET, FILM COATED ORAL ONCE
Refills: 0 | Status: COMPLETED | OUTPATIENT
Start: 2023-03-06 | End: 2023-03-06

## 2023-03-06 RX ADMIN — VALACYCLOVIR 1000 MILLIGRAM(S): 500 TABLET, FILM COATED ORAL at 03:39

## 2023-03-06 RX ADMIN — Medication 80 MILLIGRAM(S): at 03:39

## 2023-03-06 NOTE — ED PROVIDER NOTE - NSFOLLOWUPINSTRUCTIONS_ED_ALL_ED_FT
--given that you were in the ED today, we recommend a followup visit with your general doctor (primary care doctor) within 7 days.   --your diagnosis is: bells palsy  --return to the ED if your current symptoms worsen, if your pain doesn't resolve with tylenol/motrin.  --we sent medications to your pharmacy, take as prescribed. (prednisone, valacyclovir)     BELLS PALSY    Definition  Bell palsy is a disorder of the nerve that controls movement of the muscles in the face. This nerve is called the facial or seventh cranial nerve.    Damage to this nerve causes weakness or paralysis of these muscles. Paralysis means that you cannot use the muscles at all.    Alternative Names  Facial palsy; Idiopathic peripheral facial palsy; Cranial mononeuropathy - Bell palsy; Bell palsy    Causes  Bell palsy can affect people of any age, most commonly those over age 65 years. It can also affect children younger than 10 years. Males and females are equally affected.    Bell palsy is thought to be due to swelling (inflammation) of the facial nerve in the area where it travels through the bones of the skull. This nerve controls movement of the muscles of the face.    The cause is often not clear. A type of herpes infection called herpes simplex or herpes zoster might be involved. Other conditions that may cause Bell palsy include:    HIV/AIDS infection  Lyme disease  Middle ear infection  Sarcoidosis (inflammation of the lymph nodes, lungs, liver, eyes, skin, or other tissues)  Having diabetes and being pregnant may increase the risk for Bell palsy.    Symptoms  Sometimes, you may have a cold shortly before the symptoms of Bell palsy begin.    Symptoms most often start suddenly, but may take 2 to 3 days to show up. They do not become more severe after that.    Symptoms are almost always on one side of the face only. They may range from mild to severe.    Many people feel discomfort behind the ear before weakness is noticed. The face feels stiff or pulled to one side and may look different. Other signs can include:    Difficulty closing one eye  Difficulty eating and drinking because food falls out of one side of the mouth  Drooling due to lack of control over the muscles of the face  Drooping of the face, such as the eyelid or corner of the mouth  Problems smiling, grimacing, or making facial expressions  Twitching or weakness of the muscles in the face  Other symptoms that may occur:    Dry eye, which may lead to eye sores or infections  Dry mouth  Headache if there is an infection such as Lyme disease  Loss of sense of taste  Sound that is louder in one ear (hyperacusis)      Freeburn (Prognosis)  Most cases go away completely within a few weeks to months.    If you did not lose all of your nerve function and symptoms began to improve within 3 weeks, you are more likely to regain all or most of the strength in your facial muscles.    Sometimes, the following symptoms may still be present:    Long-term changes in taste  Spasms of muscles or eyelids  Weakness that remains in facial muscles

## 2023-03-06 NOTE — ED ADULT NURSE NOTE - OBJECTIVE STATEMENT
Pt received in spot 1b. Pt comes into the ED complaining of high blood pressure and facial changes to R side. Phmx of HTN. A&Ox4. R facial weakness observed. No slurred speech observed. Respirations even and unlabored. Pt denies headache, weakness, chest pain, shortness of breath, and N/V. No abdominal distension noted. Strength and sensation equal in all 4 quadrants. Pt medicated per MAR.

## 2023-03-06 NOTE — ED PROVIDER NOTE - OBJECTIVE STATEMENT
58YO F hx HTN, pre-DM p/w Right facial muscle weakness.  Onset 11 PM this evening, no prior history of the symptoms.  Denies changes to sensation, denies neurodeficits to other areas, including numbness/weakness/paresthesias to arms/legs, vision changes.  Patient noted to check blood pressure at 11 PM, systolic high in the 200s, on arrival improved to 150, patient took home hypertension medications.  Denies recent fevers, cough, chest pain, shortness of breath

## 2023-03-06 NOTE — ED ADULT NURSE NOTE - SUICIDE SCREENING QUESTION 1
2021   EMPLOYEE INFORMATION: EMPLOYER INFORMATION:   NAME: Moira [employer]   : 1961 [phone]    DATE OF INJURY/EVENT: 2021           Location: [facility]   Treating Provider: Baldemar Arteaga MD  Time In:  7:59 AM Time Out:  8:28 AM      DIAGNOSIS: No diagnosis found.  STATUS: This injury is determined to be WORK RELATED.    RETURN TO WORK: Employee may return to work without restrictions.Employee is discharged from care. Return Date: 2021           RESTRICTIONS:  No Restrictions    TREATMENT PLAN:   Medications for this injury/condition: None   Referral/Consult: None  Diagnostic Testing: None       Instructions:         NEXT RETURN VISIT: None  Thank you for the privilege of providing medical care for this injury/condition.  If there are any questions, please call the occupational health clinic at Dept: [phone].      Electronically signed on 2021 at 8:28 AM by:   Baldemar Arteaga MD   [facility]     No

## 2023-03-06 NOTE — ED ADULT NURSE NOTE - NSIMPLEMENTINTERV_GEN_ALL_ED
Implemented All Universal Safety Interventions:  [place] to call system. Call bell, personal items and telephone within reach. Instruct patient to call for assistance. Room bathroom lighting operational. Non-slip footwear when patient is off stretcher. Physically safe environment: no spills, clutter or unnecessary equipment. Stretcher in lowest position, wheels locked, appropriate side rails in place.

## 2023-03-06 NOTE — ED ADULT TRIAGE NOTE - CHIEF COMPLAINT QUOTE
Pt c/o of htn x this morning bp was 203/103 at home. Denies chest pain, sob, dizziness, headache. Pt compliant with bp meds. PMHx: HTN **downtime backload.

## 2023-03-06 NOTE — ED PROVIDER NOTE - CLINICAL SUMMARY MEDICAL DECISION MAKING FREE TEXT BOX
Dawn Nix MD, PGY-3: 58YO F hx HTN, pre-DM p/w Right facial muscle weakness. vss, normotensive, pe diminished strength R face. concern for  Hammer's palsy, do not suspect stroke given CN VII involvement throughout entire R face, no forehead sparing. do not suspect stroke or HTN emergency, pt with normotension, no sxs of h/a, cp, sob. plan for prednisone, valacyclovir, dc.

## 2023-03-06 NOTE — ED PROVIDER NOTE - ATTENDING CONTRIBUTION TO CARE
57-year-old female with a past medical history of hypertension presents emerged from today with 2 complaints.  The first one is a right-sided atypical facial sensation.  States that she looked in the mirror and noticed her face was not what it normally looks like.  Made her feel uncomfortable so she took her blood pressure which was elevated to the 200s systolic.  She then took a dose of her home blood pressure medications.  The patient's denies any headaches, chest pain, visual changes.  Has not had this happen before.    Vitals: I have reviewed the patients vital signs  General: nontoxic appearing  HEENT: Atraumatic, normocephalic, airway patent  Eyes: EOMI, tracking appropriately  Neck: no tracheal deviation  Chest/Lungs: no trauma, symmetric chest rise, speaking in complete sentences,  no resp distress  Heart: skin and extremities well perfused, regular rate and rhythm  Neuro: A+Ox3, appears non focal, patient does have a right-sided facial droop.  There is weakness in the right forehead muscles as well.  There is no ptosis at this time.  MSK: no deformities  Skin: no cyanosis, no jaundice   Psych:  Normal mood and affect    57-year-old female with a past medical history of hypertension presenting with right-sided atypical facial sensations and facial droop.  Based on the distribution of the symptoms this is mostly consistent with a Bell's palsy.  Her blood pressure has improved at this point in time.  It was asymptomatic as well once it started.  Will not take any more measures with regards to the blood pressure.  Will prescribe steroids and antivirals for her Bell's palsy.

## 2023-03-06 NOTE — ED PROVIDER NOTE - PATIENT PORTAL LINK FT
You can access the FollowMyHealth Patient Portal offered by [facility] by registering at the following website: [email or web address]. By joining "[employer]’s FollowMyHealth portal, you will also be able to view your health information using other applications (apps) compatible with our system.

## 2023-03-06 NOTE — ED ADULT TRIAGE NOTE - NSTRIAGECARE_GEN_A_ER
Supervising Physician: Otilio Biswas M.D.    Procedure: Right reverse total shoulder arthroplasty    Post op day # 1    Subjective:  The patient's nerve block wore off early this morning. After taking an oral analgesic she fell asleep and slept through next time she could've been medicated. Her pain was fairly significant when she woke up. She has no further numbness or tingling down in the hand. The patient was hoping to be discharged to home but realizes that she is not safe even that she can't use the right upper extremity weight bearing. She is agreeable to a short stay at a subacute rehabilitation facility.    MEDICATIONS:  Medications   warfarin (COUMADIN) tablet 6 mg (6 mg Oral Given 8/4/17 1748)   furosemide (LASIX) tablet 60 mg (60 mg Oral Given 8/5/17 0744)   digoxin (LANOXIN) tablet 125 mcg (125 mcg Oral Given 8/5/17 0745)   isosorbide mononitrate (IMDUR) ER tablet 60 mg (60 mg Oral Not Given 8/5/17 0745)   levothyroxine (SYNTHROID, LEVOTHROID) tablet 150 mcg (150 mcg Oral Given 8/5/17 0625)   metoPROLOL (LOPRESSOR) tablet 12.5 mg (12.5 mg Oral Not Given 8/5/17 0746)   pantoprazole (PROTONIX) EC tablet 40 mg (40 mg Oral Given 8/5/17 0625)   polyethylene glycol (GLYCOLAX, MIRALAX) packet 17 g (17 g Oral Given 8/5/17 0746)   potassium chloride (K-DUR,KLOR-CON) CR tablet 20 mEq (20 mEq Oral Given 8/5/17 0746)   pravastatin (PRAVACHOL) tablet 40 mg (40 mg Oral Given 8/4/17 2023)   ondansetron (ZOFRAN) injection 4 mg (not administered)   HYDROcodone-acetaminophen (NORCO) 5-325 MG per tablet 1-2 tablet (1 tablet Oral Given 8/5/17 0624)   morphine injection 1-4 mg (2 mg Intravenous Given 8/5/17 0744)   diphenhydrAMINE (BENADRYL) 12.5 MG/5ML liquid 12.5 mg (not administered)   senna (SENOKOT) 8.6 mg (8.6 mg Oral Given 8/4/17 2023)   lactated ringers infusion ( Intravenous Stopped 8/5/17 0000)   tranexamic acid (CYKLOKAPRO) 1,000 mg in sodium chloride 0.9 % 50 mL IVPB (1,000 mg  New Bag 8/4/17 0812)   famotidine  (PEPCID) injection 20 mg (20 mg Intravenous Given 8/4/17 7096)   ceFAZolin 2,000 mg in sodium chloride 0.9% 100 mL IVPB (0 mg Intravenous Completed 8/4/17 2216)   acetaminophen (OFIRMEV) IVPB 1,000 mg (0 mg Intravenous Completed 8/4/17 6255)   SODIUM CHLORIDE 0.9 % IV SOLN Pyxis Override (250 mLs  New Bag 8/4/17 0331)         ROS:  General: As above. Patient was able to sleep last night.  Pulmonary: Patient denies any shortness of breath.  Cardiovascular: Patient denies chest pain or palpitations.  Gastrointestinal: Patient denies nausea or vomiting.  Urinary: Patient denies any difficulty voiding.  Musculoskeletal: As above  Skin: No complaints  Neurologic: As above    PHYSICAL EXAM:  Vital Signs:   Visit Vitals  /66 (BP Location: LUE, Patient Position: Semi-Xiao's)   Pulse 80   Temp 97.8 °F (36.6 °C) (Temporal Artery)   Resp 16   Ht 5' 3\" (1.6 m)   Wt 96.5 kg   SpO2 95%   BMI 37.69 kg/m²     General: Patient is alert and oriented ×3 in no acute distress.  HEENT: Sclera are anicteric. Mucous membranes are moist  Cardiovascular:  Regular rate and rhythm. Distal pulses are equal. No calf tenderness.  Respiratory:  Respirations are non-labored. There is no audible wheezing or rhonchi.  Abdomen: Soft and nontender. No obvious organomegaly.  Musculoskeletal: The patient's right shoulder area is mildly edematous. There is no significant ecchymosis. The right arm is in a sling. She moves the other 3 extremities without difficulty  Skin: The anterior right shoulder incision is well approximated with staples. There is no active drainage. The drain is easily removed.  Neuro: Sensation and strength are equal in both hands along the radial, median and ulnar nerves    LABS:  CBC:  Lab Results   Component Value Date/Time    WBC 10.3 08/05/2017 04:15 AM    HGB 10.6 (L) 08/05/2017 04:15 AM    HCT 33.2 (L) 08/05/2017 04:15 AM     08/05/2017 04:15 AM     02/10/2014 11:08 AM     08/30/2011 11:13 AM      CMP:  Lab Results   Component Value Date/Time    SODIUM 140 08/05/2017 04:15 AM    POTASSIUM 4.5 08/05/2017 04:15 AM    BUN 37 (H) 08/05/2017 04:15 AM    CREATININE 1.22 (H) 08/05/2017 04:15 AM    GLUCOSE 143 (H) 08/05/2017 04:15 AM    CALCIUM 8.8 08/05/2017 04:15 AM    CALCIUM 10.0 08/30/2011 11:13 AM    ALBUMIN 3.7 07/25/2017 09:01 AM    AST 27 07/25/2017 09:01 AM    BILIRUBIN 0.5 07/25/2017 09:01 AM    TP 8.1 01/04/2016 10:26 AM     PT/INR  PROTIME (sec)   Date Value   08/05/2017 16.0 (H)      INR (no units)   Date Value   08/05/2017 1.5        IMAGING:  Results for orders placed during the hospital encounter of 08/04/17   XR SHOULDER 1 VW RIGHT    Impression IMPRESSION:   1. Right shoulder reverse arthroplasty.         ASSESSMENT AND PLAN:  Stable postop   Pain issues are being addressed.  Per therapy recommendation, pt is agreeable to subacute rehabilitation upon discharge. We'll transfer on Monday if medically stable and bed is available  Continue to monitor PT/INR     Surgical Care Improvement Project:    Jennifer in Place: No    DVT/VTE Prophylaxis:  VTE Pharmacologic Prophylaxis: Yes  VTE Mechanical Prophylaxis: Yes    Antibiotics D/C'd within 24 hours of surgery end: Yes    Central Line: No    Beta Blocker: Yes        EKG

## 2023-03-06 NOTE — ED PROVIDER NOTE - PHYSICAL EXAMINATION
Gen: WDWN, NAD  HEENT: EOMI, no nasal discharge, mucous membranes moist, + mild R eye conjunctival injection  CV: RRR, 2+ radial pulses L  Resp: no accessory muscle use, no increased work of breathing  MSK: No open wounds, no bruising, no LE edema  Neuro: A&Ox4, following commands, moving all four extremities spontaneously. normal gait. CN VII decreased strength on R face, including weakened forehead muscles. CN III-VI, VIII-XII intact.   Psych: appropriate mood

## 2023-04-29 NOTE — ED PROVIDER NOTE - OBJECTIVE STATEMENT
No 55y F w/ PMHx of HTN presents to the ED c/o constant L flank pain since Thursday night. Denies similar previous incident or specific trauma. Worsens w/ positional change/turning. Radiates to abd. Denies numbness/tingling, N/V, hematuria, dysuria, frequency. Denies Hx or FMHx of kidney stone. Has taken Tylenol w/ little improvement.

## 2023-08-24 NOTE — ASU DISCHARGE PLAN (ADULT/PEDIATRIC) - ACTIVITY LEVEL
No heavy lifting/Weight bearing as tolerated/Nothing per rectum/Nothing per vagina/No tub baths/No douching/No tampons/No intercourse
No

## 2023-11-26 NOTE — PACU DISCHARGE NOTE - NS MD DISCHARGE NOTE DISCHARGE
"Patient with 2 cardioversion's in the last month, recent zio patch placed yesterday. SOB and DELEON just wont \"go away\". Hx of afib, on xarelto.        "
Home

## 2024-03-29 NOTE — ED ADULT TRIAGE NOTE - NS ED NOTE AC HIGH RISK COUNTRIES
No 03-29    142  |  103  |  19  ----------------------------<  101<H>  3.7   |  25  |  1.42<H>    Ca    8.6      29 Mar 2024 06:42    CAPILLARY BLOOD GLUCOSE  POCT Blood Glucose.: 149 mg/dL (29 Mar 2024 11:46)  POCT Blood Glucose.: 135 mg/dL (29 Mar 2024 08:12)  POCT Blood Glucose.: 145 mg/dL (28 Mar 2024 21:36)  POCT Blood Glucose.: 104 mg/dL (28 Mar 2024 17:00)    A1C with Estimated Average Glucose Result: 6.2 % (03-28-24 @ 07:36)  A1C with Estimated Average Glucose Result: 6.4 % (06-04-23 @ 05:57)

## 2024-05-14 NOTE — ED PROVIDER NOTE - IV ALTEPLASE EXCL ABS HIDDEN
show

## 2024-07-04 NOTE — ED ADULT NURSE NOTE - NEURO GAIT
EMS at the bedside to transport patient to nursing facility. Patient vitals stable on room air.Patient had 1 bowel movement. Patient cleaned and repositioned for comfort, ngera care and new gown provided . Patient tolerated well. Patient purwick removed. Belongings given to EMS.All needs met   steady

## 2024-10-31 NOTE — ED ADULT TRIAGE NOTE - SPO2 (%)
Patient called in requesting the order for his wheelchair be faxed back to the [employer] company. States he has talked to them numerous times and has been told they are just waiting on the signed order from [employer]. Per Estefani I informed him that the order and papers will not be signed until his appt with florecita on 11/6. He was very upset and said he is so tired of jumping through all the hoops and doing everything he has been told to do to get nothing in return. I attempted to explain the hoops we have to jump through as well to make sure the insurance will cover some or all of the cost for the chair. However, he was still very mad and upset stating he isnt going to be coming to any more appts and he is done with everything. I did not cancel that 11/6 appt yet incase you wanted to reach out to him to make sure he no longer wants that appt or any other appts from here on out?    99

## 2024-12-06 NOTE — ASU PREOP CHECKLIST - PATIENT SENT TO
Petra Lucía presents today for   Chief Complaint   Patient presents with    Congestion     VV- patient reports symptoms started Monday, nasal congestion and discharge, trouble sleeping, cough, and chills       Health Maintenance reviewed and discussed and ordered per Provider.    Health Maintenance Due   Topic Date Due    Pneumococcal 0-64 years Vaccine (1 of 2 - PCV) Never done    Varicella vaccine (1 of 2 - 13+ 2-dose series) Never done    Hepatitis B vaccine (1 of 3 - 19+ 3-dose series) Never done    DTaP/Tdap/Td vaccine (1 - Tdap) Never done    Flu vaccine (1) Never done    COVID-19 Vaccine (1 - 2023-24 season) Never done   .      \"Have you been to the ER, urgent care clinic since your last visit?  Hospitalized since your last visit?\"    NO    “Have you seen or consulted any other health care providers outside of [facility] since your last visit?”    NO             
times daily as needed for Pain Take with food  Gracie Caraballo MD   atenolol (TENORMIN) 100 MG tablet Take 1 tablet by mouth daily  Gracie Caraballo MD   FEROSUL 325 (65 Fe) MG tablet TAKE 1 TABLET BY MOUTH EVERY OTHER DAY  Gracie Caraballo MD   vitamin D (ERGOCALCIFEROL) 1.25 MG (22269 UT) CAPS capsule Take 1 capsule by mouth once a week  Gracie Caraballo MD   EQUATE STOOL SOFTENER 100 MG capsule Take 1 capsule by mouth twice daily  Patient not taking: Reported on 5/8/2024  Gracie Caraballo MD       Social History     Tobacco Use    Smoking status: Every Day     Current packs/day: 0.50     Average packs/day: 0.5 packs/day for 15.0 years (7.5 ttl pk-yrs)     Types: Cigarettes     Passive exposure: Current    Smokeless tobacco: Never   Vaping Use    Vaping status: Never Used   Substance Use Topics    Alcohol use: Not Currently    Drug use: Not Currently        Allergies   Allergen Reactions    Penicillins Hives    Sulfa Antibiotics Hives   ,   Past Medical History:   Diagnosis Date    Anxiety     GERD (gastroesophageal reflux disease)     Hypertension     Hypokalemia     Kidney disease     Rhabdomyolysis        PHYSICAL EXAMINATION:  [ INSTRUCTIONS:  \"[x]\" Indicates a positive item  \"[]\" Indicates a negative item  -- DELETE ALL ITEMS NOT EXAMINED]  Vital Signs: (As obtained by patient/caregiver or practitioner observation)    Blood pressure-  Heart rate-    Respiratory rate-    Temperature-  Pulse oximetry-     Constitutional: [x] Appears well-developed and well-nourished [x] No acute distress      [x] Abnormal- appears fatigued  Mental status  [x] Alert and awake  [x] Oriented to person/place/time [x]Able to follow commands      Eyes:  EOM    []  Normal  [] Abnormal-  Sclera  [x]  Normal  [] Abnormal -         Discharge [x]  None visible  [] Abnormal -    HENT:   [x] Normocephalic, atraumatic.  [] Abnormal   [x] Mouth/Throat: Mucous membranes are moist.     External Ears [x] Normal  [] Abnormal-     Neck: [x] No 
operating room

## 2025-01-26 ENCOUNTER — EMERGENCY (EMERGENCY)
Facility: HOSPITAL | Age: 60
LOS: 1 days | Discharge: HOME | End: 2025-01-26
Attending: EMERGENCY MEDICINE
Payer: COMMERCIAL

## 2025-01-26 VITALS
SYSTOLIC BLOOD PRESSURE: 179 MMHG | HEART RATE: 90 BPM | HEIGHT: 62 IN | TEMPERATURE: 98 F | OXYGEN SATURATION: 100 % | DIASTOLIC BLOOD PRESSURE: 109 MMHG | WEIGHT: 179.9 LBS | RESPIRATION RATE: 18 BRPM

## 2025-01-26 DIAGNOSIS — Z98.891 HISTORY OF UTERINE SCAR FROM PREVIOUS SURGERY: Chronic | ICD-10-CM

## 2025-01-26 LAB
ALBUMIN SERPL ELPH-MCNC: 4.3 G/DL — SIGNIFICANT CHANGE UP (ref 3.3–5)
ALP SERPL-CCNC: 111 U/L — SIGNIFICANT CHANGE UP (ref 40–120)
ALT FLD-CCNC: 19 U/L — SIGNIFICANT CHANGE UP (ref 10–45)
ANION GAP SERPL CALC-SCNC: 11 MMOL/L — SIGNIFICANT CHANGE UP (ref 5–17)
APPEARANCE UR: CLEAR — SIGNIFICANT CHANGE UP
APPEARANCE UR: CLEAR — SIGNIFICANT CHANGE UP
APTT BLD: 39.4 SEC — HIGH (ref 24.5–35.6)
AST SERPL-CCNC: 21 U/L — SIGNIFICANT CHANGE UP (ref 10–40)
BACTERIA # UR AUTO: ABNORMAL /HPF
BACTERIA # UR AUTO: NEGATIVE /HPF — SIGNIFICANT CHANGE UP
BASOPHILS # BLD AUTO: 0 K/UL — SIGNIFICANT CHANGE UP (ref 0–0.2)
BASOPHILS NFR BLD AUTO: 0 % — SIGNIFICANT CHANGE UP (ref 0–2)
BILIRUB SERPL-MCNC: 0.7 MG/DL — SIGNIFICANT CHANGE UP (ref 0.2–1.2)
BILIRUB UR-MCNC: NEGATIVE — SIGNIFICANT CHANGE UP
BILIRUB UR-MCNC: NEGATIVE — SIGNIFICANT CHANGE UP
BUN SERPL-MCNC: 7 MG/DL — SIGNIFICANT CHANGE UP (ref 7–23)
CALCIUM SERPL-MCNC: 9.8 MG/DL — SIGNIFICANT CHANGE UP (ref 8.4–10.5)
CAST: 0 /LPF — SIGNIFICANT CHANGE UP (ref 0–4)
CAST: 3 /LPF — SIGNIFICANT CHANGE UP (ref 0–4)
CHLORIDE SERPL-SCNC: 103 MMOL/L — SIGNIFICANT CHANGE UP (ref 96–108)
CO2 SERPL-SCNC: 27 MMOL/L — SIGNIFICANT CHANGE UP (ref 22–31)
COLOR SPEC: YELLOW — SIGNIFICANT CHANGE UP
COLOR SPEC: YELLOW — SIGNIFICANT CHANGE UP
CREAT SERPL-MCNC: 0.67 MG/DL — SIGNIFICANT CHANGE UP (ref 0.5–1.3)
DIFF PNL FLD: NEGATIVE — SIGNIFICANT CHANGE UP
DIFF PNL FLD: NEGATIVE — SIGNIFICANT CHANGE UP
EGFR: 101 ML/MIN/1.73M2 — SIGNIFICANT CHANGE UP
EOSINOPHIL # BLD AUTO: 0.02 K/UL — SIGNIFICANT CHANGE UP (ref 0–0.5)
EOSINOPHIL NFR BLD AUTO: 0.8 % — SIGNIFICANT CHANGE UP (ref 0–6)
GLUCOSE SERPL-MCNC: 150 MG/DL — HIGH (ref 70–99)
GLUCOSE UR QL: NEGATIVE MG/DL — SIGNIFICANT CHANGE UP
GLUCOSE UR QL: NEGATIVE MG/DL — SIGNIFICANT CHANGE UP
HCT VFR BLD CALC: 42.8 % — SIGNIFICANT CHANGE UP (ref 34.5–45)
HGB BLD-MCNC: 13.6 G/DL — SIGNIFICANT CHANGE UP (ref 11.5–15.5)
INR BLD: 1.16 RATIO — SIGNIFICANT CHANGE UP (ref 0.85–1.16)
KETONES UR-MCNC: NEGATIVE MG/DL — SIGNIFICANT CHANGE UP
KETONES UR-MCNC: NEGATIVE MG/DL — SIGNIFICANT CHANGE UP
LEUKOCYTE ESTERASE UR-ACNC: ABNORMAL
LEUKOCYTE ESTERASE UR-ACNC: ABNORMAL
LYMPHOCYTES # BLD AUTO: 1.75 K/UL — SIGNIFICANT CHANGE UP (ref 1–3.3)
LYMPHOCYTES # BLD AUTO: 60.7 % — HIGH (ref 13–44)
MANUAL SMEAR VERIFICATION: SIGNIFICANT CHANGE UP
MCHC RBC-ENTMCNC: 27.4 PG — SIGNIFICANT CHANGE UP (ref 27–34)
MCHC RBC-ENTMCNC: 31.8 G/DL — LOW (ref 32–36)
MCV RBC AUTO: 86.1 FL — SIGNIFICANT CHANGE UP (ref 80–100)
MONOCYTES # BLD AUTO: 0.12 K/UL — SIGNIFICANT CHANGE UP (ref 0–0.9)
MONOCYTES NFR BLD AUTO: 4.3 % — SIGNIFICANT CHANGE UP (ref 2–14)
NEUTROPHILS # BLD AUTO: 0.99 K/UL — LOW (ref 1.8–7.4)
NEUTROPHILS NFR BLD AUTO: 34.2 % — LOW (ref 43–77)
NITRITE UR-MCNC: NEGATIVE — SIGNIFICANT CHANGE UP
NITRITE UR-MCNC: NEGATIVE — SIGNIFICANT CHANGE UP
PH UR: 6.5 — SIGNIFICANT CHANGE UP (ref 5–8)
PH UR: 7 — SIGNIFICANT CHANGE UP (ref 5–8)
PLAT MORPH BLD: NORMAL — SIGNIFICANT CHANGE UP
PLATELET # BLD AUTO: 246 K/UL — SIGNIFICANT CHANGE UP (ref 150–400)
POTASSIUM SERPL-MCNC: 3.3 MMOL/L — LOW (ref 3.5–5.3)
POTASSIUM SERPL-SCNC: 3.3 MMOL/L — LOW (ref 3.5–5.3)
PROT SERPL-MCNC: 8.5 G/DL — HIGH (ref 6–8.3)
PROT UR-MCNC: NEGATIVE MG/DL — SIGNIFICANT CHANGE UP
PROT UR-MCNC: NEGATIVE MG/DL — SIGNIFICANT CHANGE UP
PROTHROM AB SERPL-ACNC: 13.2 SEC — SIGNIFICANT CHANGE UP (ref 9.9–13.4)
RBC # BLD: 4.97 M/UL — SIGNIFICANT CHANGE UP (ref 3.8–5.2)
RBC # FLD: 14.4 % — SIGNIFICANT CHANGE UP (ref 10.3–14.5)
RBC BLD AUTO: SIGNIFICANT CHANGE UP
RBC CASTS # UR COMP ASSIST: 0 /HPF — SIGNIFICANT CHANGE UP (ref 0–4)
RBC CASTS # UR COMP ASSIST: 1 /HPF — SIGNIFICANT CHANGE UP (ref 0–4)
SODIUM SERPL-SCNC: 141 MMOL/L — SIGNIFICANT CHANGE UP (ref 135–145)
SP GR SPEC: >1.03 — HIGH (ref 1–1.03)
SP GR SPEC: >1.03 — HIGH (ref 1–1.03)
SQUAMOUS # UR AUTO: 1 /HPF — SIGNIFICANT CHANGE UP (ref 0–5)
SQUAMOUS # UR AUTO: 5 /HPF — SIGNIFICANT CHANGE UP (ref 0–5)
TROPONIN T, HIGH SENSITIVITY RESULT: <6 NG/L — SIGNIFICANT CHANGE UP (ref 0–51)
UROBILINOGEN FLD QL: 1 MG/DL — SIGNIFICANT CHANGE UP (ref 0.2–1)
UROBILINOGEN FLD QL: 1 MG/DL — SIGNIFICANT CHANGE UP (ref 0.2–1)
WBC # BLD: 2.89 K/UL — LOW (ref 3.8–10.5)
WBC # FLD AUTO: 2.89 K/UL — LOW (ref 3.8–10.5)
WBC UR QL: 1 /HPF — SIGNIFICANT CHANGE UP (ref 0–5)
WBC UR QL: 8 /HPF — HIGH (ref 0–5)

## 2025-01-26 PROCEDURE — 99291 CRITICAL CARE FIRST HOUR: CPT

## 2025-01-26 PROCEDURE — 70551 MRI BRAIN STEM W/O DYE: CPT | Mod: 26

## 2025-01-26 PROCEDURE — 70450 CT HEAD/BRAIN W/O DYE: CPT | Mod: 26,59

## 2025-01-26 PROCEDURE — 70496 CT ANGIOGRAPHY HEAD: CPT | Mod: 26

## 2025-01-26 PROCEDURE — 70498 CT ANGIOGRAPHY NECK: CPT | Mod: 26

## 2025-01-26 RX ORDER — ROSUVASTATIN 40 MG/1
40 TABLET, FILM COATED ORAL AT BEDTIME
Refills: 0 | Status: ACTIVE | OUTPATIENT
Start: 2025-01-26 | End: 2025-12-25

## 2025-01-26 RX ORDER — POTASSIUM CHLORIDE 600 MG/1
40 TABLET, FILM COATED, EXTENDED RELEASE ORAL ONCE
Refills: 0 | Status: COMPLETED | OUTPATIENT
Start: 2025-01-26 | End: 2025-01-26

## 2025-01-26 RX ORDER — POTASSIUM CHLORIDE 600 MG/1
10 TABLET, FILM COATED, EXTENDED RELEASE ORAL DAILY
Refills: 0 | Status: ACTIVE | OUTPATIENT
Start: 2025-01-26 | End: 2025-12-25

## 2025-01-26 RX ORDER — CLOPIDOGREL BISULFATE 75 MG/1
300 TABLET, FILM COATED ORAL ONCE
Refills: 0 | Status: COMPLETED | OUTPATIENT
Start: 2025-01-26 | End: 2025-01-26

## 2025-01-26 RX ORDER — ASPIRIN 81 MG
81 TABLET, DELAYED RELEASE (ENTERIC COATED) ORAL ONCE
Refills: 0 | Status: COMPLETED | OUTPATIENT
Start: 2025-01-26 | End: 2025-01-26

## 2025-01-26 RX ORDER — ASPIRIN 81 MG
81 TABLET, DELAYED RELEASE (ENTERIC COATED) ORAL DAILY
Refills: 0 | Status: ACTIVE | OUTPATIENT
Start: 2025-01-26 | End: 2025-12-25

## 2025-01-26 RX ORDER — CLOPIDOGREL BISULFATE 75 MG/1
75 TABLET, FILM COATED ORAL DAILY
Refills: 0 | Status: ACTIVE | OUTPATIENT
Start: 2025-01-26 | End: 2025-12-25

## 2025-01-26 RX ADMIN — CLOPIDOGREL BISULFATE 300 MILLIGRAM(S): 75 TABLET, FILM COATED ORAL at 14:41

## 2025-01-26 RX ADMIN — Medication 81 MILLIGRAM(S): at 14:42

## 2025-01-26 RX ADMIN — ROSUVASTATIN 40 MILLIGRAM(S): 40 TABLET, FILM COATED ORAL at 22:14

## 2025-01-26 RX ADMIN — POTASSIUM CHLORIDE 40 MILLIEQUIVALENT(S): 600 TABLET, FILM COATED, EXTENDED RELEASE ORAL at 14:41

## 2025-01-26 NOTE — ED PROVIDER NOTE - PROGRESS NOTE DETAILS
Neuro recommending asa, plavix, and CDU for MRI. d/w CDU PA.  - Kaela Jimenez PA-C Patient endorsed to the observation PA at the time of observation order placement.  Based on patient's history and physical exam, as well as the results of today's workup, I feel that patient warrants observation in the hospital for further workup/evaluation and continued management. I discussed the findings of today's workup with the patient and addressed the patient's questions and concerns. The patient was agreeable with observation. Our team spoke with the CDU receiving team who accepted the patient for observation and subsequently took over the patient's care at the time of order placement for observation.

## 2025-01-26 NOTE — ED ADULT NURSE NOTE - OBJECTIVE STATEMENT
59y Female PMHx HTN, CVA, bells palsy, preDM, and HLD presenting to ED via walk-in triage for LUE numbness. Pt states today at 1p while she was having lunch she felt numbness spreading from L hand up her arm. Pt associated symptoms with some blurry vision and "head throbbing" that all resolved within 30min. Pt reports similar episode yesterday of LUE numbness and noticed her BP was elevated at the time. No associated dizziness, weakness, double vision, n/v, CP, SOB. Code stroke activated, pt taken to CT, IV placed, labs drawn and sent, seen and eval by ED team and neuro. Stretcher in lowest position and locked, call bell within reach.

## 2025-01-26 NOTE — ED CDU PROVIDER INITIAL DAY NOTE - TOBACCO USE
Never smoker Solaraze Counseling:  I discussed with the patient the risks of Solaraze including but not limited to erythema, scaling, itching, weeping, crusting, and pain.

## 2025-01-26 NOTE — CONSULT NOTE ADULT - ATTENDING COMMENTS
DOS 1/27  Briefly   ASSESSMENT   59 R-H F with h/o HTN, pre-DM and Right facial [place] Palsy presented to the ED with LUE numbness and "head pressure." Symptoms first started at 1400 1/25/2025 transient, lasted several minutes but extinguished. She went to bed and woke up at her baseline at 1300 1/26 while she was outside having lunch developed another episode of LUE numbness that persisted. Decided to come to the ED. CODE STROKE called at 1326. mRS 0. NIHSS 1. CT head non con demonstrated no hemorrhage nor mass effect. CT angio demonstrated no LVO. Not candidate for tenecteplase nor thrombectomy because low NIHSS.   MRI brain neg for acute findigns       IMPRESSION   Overall stable neurologically. 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     RECOMMENDATION    [] Aspirin 81mg daily + Plavix 75 mg daily. Can load with plavix 300mg x1 then 75mg daily. Would continue with aspirin 81mg and Plavix 75mg for 3 weeks followed by aspirin 81 alone  [] Atorvastatin 80 mg daily titrated per LDL < 70  [] HgbA1C, fasting lipid panel, CBC, CMP, coag panel, troponin  [] TTE w/ bubble study can be outpatient   [] telemetry to check for arrhythmia, EKG,      - Tight glucose control (long-term goal HgbA1c < 6%)  - Stroke education and counseling  - Neuro-checks and VS q4h  - Permissive HTN up to 220/120 for 24-48h from symptom onset  - Dysphagia screen. If fails, speech/swallow eval  - aspiration, fall precautions  - STAT CT head non-contrast for change in neuro exam.   - PT/ OT / DVT ppx per primary team     outpatient f/u on discharge  Francisco Mea MD  Vascular Neurology  Office: [phone]

## 2025-01-26 NOTE — ED CDU PROVIDER INITIAL DAY NOTE - CLINICAL SUMMARY MEDICAL DECISION MAKING FREE TEXT BOX
See observation monitoring plan section.   ***Benedict Reyes MD***  I have personally performed a face to face diagnostic evaluation on this patient.  I have reviewed the ACP note and agree with the history, exam, and plan of care, except as noted. Patient will be reassessed and discussed with AM/PM CDU/FT MD who will follow up on labs, analgesia, imaging and disposition as clinically indicated. Please see emergency department provider note.

## 2025-01-26 NOTE — ED CDU PROVIDER DISPOSITION NOTE - PATIENT PORTAL LINK FT
You can access the FollowMyHealth Patient Portal offered by [facility] by registering at the following website: [email or web address]. By joining [email or web address]’s FollowMyHealth portal, you will also be able to view your health information using other applications (apps) compatible with our system.

## 2025-01-26 NOTE — ED CDU PROVIDER DISPOSITION NOTE - PROVIDER TOKENS
PROVIDER:[TOKEN:[[id number]],FOLLOWUP:[4-6 Days]],PROVIDER:[TOKEN:[[id number]],FOLLOWUP:[7-10 Days]]

## 2025-01-26 NOTE — ED CDU PROVIDER DISPOSITION NOTE - NSFOLLOWUPINSTRUCTIONS_ED_ALL_ED_FT
Follow up with NEUROLOGY ******      Take aspirin 81 mg once daily, take plavix for the next 3 weeks  Take your home medications for BP as prescribed - your dose of rousuvastatin ??????? change ??>???  Bring a copy of your test results with you to your appointment  Return to the Emergency Room if you experience new or worsening symptoms      Stroke Prevention  Some medical conditions and behaviors are associated with a higher chance of having a stroke. You can help prevent a stroke by making nutrition, lifestyle, and other changes, including managing any medical conditions you may have.  WHAT NUTRITION CHANGES CAN BE MADE?  Eat healthy foods. You can do this by:  · Choosing foods high in fiber, such as fresh fruits and vegetables and whole grains.  · Eating at least 5 or more servings of fruits and vegetables a day. Try to fill half of your plate at each meal with fruits and vegetables.  · Choosing lean protein foods, such as lean cuts of meat, poultry without skin, fish, tofu, beans, and nuts.  · Eating low-fat dairy products.  · Avoiding foods that are high in salt (sodium). This can help lower blood pressure.  · Avoiding foods that have saturated fat, trans fat, and cholesterol. This can help prevent high cholesterol.  · Avoiding processed and premade foods.  Follow your health care provider's specific guidelines for losing weight, controlling high blood pressure (hypertension), lowering high cholesterol, and managing diabetes. These may include:  · Reducing your daily calorie intake.  · Limiting your daily sodium intake to 1,500 milligrams (mg).  · Using only healthy fats for cooking, such as olive oil, canola oil, or sunflower oil.  · Counting your daily carbohydrate intake.  WHAT LIFESTYLE CHANGES CAN BE MADE?  Maintain a healthy weight. Talk to your health care provider about your ideal weight.  Get at least 30 minutes of moderate physical activity at least 5 days a week. Moderate activity includes brisk walking, biking, and swimming.  Do not use any products that contain nicotine or tobacco, such as cigarettes and e-cigarettes. If you need  help quitting, ask your health care provider. It may also be helpful to avoid exposure to secondhand smoke.  Limit alcohol intake to no more than 1 drink a day for nonpregnant women and 2 drinks a day for men. One  drink equals 12 oz of beer, 5 oz of wine, or 1½ oz of hard liquor.  Stop any illegal drug use.      Avoid taking birth control pills. Talk to your health care provider about the risks of taking birth control pills if:  · You are over 35 years old.  · You smoke.  · You get migraines.  · You have ever had a blood clot.  WHAT OTHER CHANGES CAN BE MADE?  Manage your cholesterol levels.  · Eating a healthy diet is important for preventing high cholesterol. If cholesterol cannot be managed  through diet alone, you may also need to take medicines.  · Take any prescribed medicines to control your cholesterol as told by your health care provider.  Manage your diabetes.  · Eating a healthy diet and exercising regularly are important parts of managing your blood sugar. If your  blood sugar cannot be managed through diet and exercise, you may need to take medicines.  · Take any prescribed medicines to control your diabetes as told by your health care provider.  Control your hypertension.  · To reduce your risk of stroke, try to keep your blood pressure below 130/80.  · Eating a healthy diet and exercising regularly are an important part of controlling your blood pressure. If  your blood pressure cannot be managed through diet and exercise, you may need to take medicines.  · Take any prescribed medicines to control hypertension as told by your health care provider.  · Ask your health care provider if you should monitor your blood pressure at home.  · Have your blood pressure checked every year, even if your blood pressure is normal. Blood pressure  increases with age and some medical conditions.  Get evaluated for sleep disorders (sleep apnea). Talk to your health care provider about getting a sleep evaluation if you snore a lot or have excessive sleepiness.  Take over-the-counter and prescription medicines only as told by your health care provider. Aspirin or blood thinners (antiplatelets or anticoagulants) may be recommended to reduce your risk of forming blood clots that can lead to stroke.  Make sure that any other medical conditions you have, such as atrial fibrillation or atherosclerosis, are  managed.  WHAT ARE THE WARNING SIGNS OF A STROKE?  The warning signs of a stroke can be easily remembered as BEFAST.  B is for balance. Signs include:  · Dizziness.  · Loss of balance or coordination.  · Sudden trouble walking.  E is for eyes. Signs include:  · A sudden change in vision.  · Trouble seeing.  F is for face. Signs include:  · Sudden weakness or numbness of the face.  · The face or eyelid drooping to one side.  A is for arms. Signs include:  · Sudden weakness or numbness of the arm, usually on one side of the body.  S is for speech. Signs include:  · Trouble speaking (aphasia).  · Trouble understanding.  T is for time.      · These symptoms may represent a serious problem that is an emergency. Do not wait to see if the symptoms will go away. Get medical help right away. Call your local emergency services (911 in the [place] Do not drive yourself to the hospital.  Other signs of stroke may include:  · A sudden, severe headache with no known cause.  · Nausea or vomiting.  · Seizure.    WHERE TO FIND MORE INFORMATION  For more information, visit:  American Stroke Association: www.strokeassociation.org  National Stroke Association: www.stroke.org  SUMMARY  You can prevent a stroke by eating healthy, exercising, not smoking, limiting alcohol intake, and managing  any medical conditions you may have.  Do not use any products that contain nicotine or tobacco, such as cigarettes and e-cigarettes. If you need  help quitting, ask your health care provider. It may also be helpful to avoid exposure to secondhand smoke.  Remember BEFAST for warning signs of stroke. Get help right away if you or a loved one has any of these  signs.  ADDITIONAL NOTES AND INSTRUCTIONS  Please follow up with your Primary MD in 24-48 hr.  Seek immediate medical care for any new/worsening signs or symptoms. Please follow-up with stroke neurologist Dr. Mae within the next 1 week.  Please call to schedule an appointment.    Francisco Mae  Neurology  [address] [facility], [address]  [place] [address]  Phone: [phone]  Follow Up Time: 4-6 Days    Additionally you should follow-up with cardiologist Dr. Cruz within the next 1 week regarding your echocardiogram that was abnormal in the emergency department.  You should likely have further outpatient cardiac testing regarding this so please call to schedule appointment.    Tobi Savage  Cardiology  [address] [address] [place] [id number]  Phone: [phone]  Follow Up Time: 7-10 Days    Take aspirin 81 mg daily and Plavix 75 mg daily.  After 3 weeks you will stop the Plavix and continue the aspirin 81 mg alone.    Continue your Rosuvastatin as prescribed and discuss this at your outpatient appointment.     Return to the Emergency Department immediately if you develop any new/worsening symptoms including but not limited to speech/visual changes, numbness/tingling, weakness, dizziness, difficulty walking, chest pain, shortness of breath or any other concerns.  ____________________________________________________________________________________________________________________    Stroke Prevention  Some medical conditions and behaviors are associated with a higher chance of having a stroke. You can help prevent a stroke by making nutrition, lifestyle, and other changes, including managing any medical conditions you may have.  WHAT NUTRITION CHANGES CAN BE MADE?  Eat healthy foods. You can do this by:  · Choosing foods high in fiber, such as fresh fruits and vegetables and whole grains.  · Eating at least 5 or more servings of fruits and vegetables a day. Try to fill half of your plate at each meal with fruits and vegetables.  · Choosing lean protein foods, such as lean cuts of meat, poultry without skin, fish, tofu, beans, and nuts.  · Eating low-fat dairy products.  · Avoiding foods that are high in salt (sodium). This can help lower blood pressure.  · Avoiding foods that have saturated fat, trans fat, and cholesterol. This can help prevent high cholesterol.  · Avoiding processed and premade foods.  Follow your health care provider's specific guidelines for losing weight, controlling high blood pressure (hypertension), lowering high cholesterol, and managing diabetes. These may include:  · Reducing your daily calorie intake.  · Limiting your daily sodium intake to 1,500 milligrams (mg).  · Using only healthy fats for cooking, such as olive oil, canola oil, or sunflower oil.  · Counting your daily carbohydrate intake.  WHAT LIFESTYLE CHANGES CAN BE MADE?  Maintain a healthy weight. Talk to your health care provider about your ideal weight.  Get at least 30 minutes of moderate physical activity at least 5 days a week. Moderate activity includes brisk walking, biking, and swimming.  Do not use any products that contain nicotine or tobacco, such as cigarettes and e-cigarettes. If you need  help quitting, ask your health care provider. It may also be helpful to avoid exposure to secondhand smoke.  Limit alcohol intake to no more than 1 drink a day for nonpregnant women and 2 drinks a day for men. One  drink equals 12 oz of beer, 5 oz of wine, or 1½ oz of hard liquor.  Stop any illegal drug use.      Avoid taking birth control pills. Talk to your health care provider about the risks of taking birth control pills if:  · You are over 35 years old.  · You smoke.  · You get migraines.  · You have ever had a blood clot.  WHAT OTHER CHANGES CAN BE MADE?  Manage your cholesterol levels.  · Eating a healthy diet is important for preventing high cholesterol. If cholesterol cannot be managed  through diet alone, you may also need to take medicines.  · Take any prescribed medicines to control your cholesterol as told by your health care provider.  Manage your diabetes.  · Eating a healthy diet and exercising regularly are important parts of managing your blood sugar. If your  blood sugar cannot be managed through diet and exercise, you may need to take medicines.  · Take any prescribed medicines to control your diabetes as told by your health care provider.  Control your hypertension.  · To reduce your risk of stroke, try to keep your blood pressure below 130/80.  · Eating a healthy diet and exercising regularly are an important part of controlling your blood pressure. If  your blood pressure cannot be managed through diet and exercise, you may need to take medicines.  · Take any prescribed medicines to control hypertension as told by your health care provider.  · Ask your health care provider if you should monitor your blood pressure at home.  · Have your blood pressure checked every year, even if your blood pressure is normal. Blood pressure  increases with age and some medical conditions.  Get evaluated for sleep disorders (sleep apnea). Talk to your health care provider about getting a sleep evaluation if you snore a lot or have excessive sleepiness.  Take over-the-counter and prescription medicines only as told by your health care provider. Aspirin or blood thinners (antiplatelets or anticoagulants) may be recommended to reduce your risk of forming blood clots that can lead to stroke.  Make sure that any other medical conditions you have, such as atrial fibrillation or atherosclerosis, are  managed.  WHAT ARE THE WARNING SIGNS OF A STROKE?  The warning signs of a stroke can be easily remembered as BEFAST.  B is for balance. Signs include:  · Dizziness.  · Loss of balance or coordination.  · Sudden trouble walking.  E is for eyes. Signs include:  · A sudden change in vision.  · Trouble seeing.  F is for face. Signs include:  · Sudden weakness or numbness of the face.  · The face or eyelid drooping to one side.  A is for arms. Signs include:  · Sudden weakness or numbness of the arm, usually on one side of the body.  S is for speech. Signs include:  · Trouble speaking (aphasia).  · Trouble understanding.  T is for time.      · These symptoms may represent a serious problem that is an emergency. Do not wait to see if the symptoms will go away. Get medical help right away. Call your local emergency services (911 in the [place] Do not drive yourself to the hospital.  Other signs of stroke may include:  · A sudden, severe headache with no known cause.  · Nausea or vomiting.  · Seizure.    WHERE TO FIND MORE INFORMATION  For more information, visit:  American Stroke Association: www.strokeassociation.org  National Stroke Association: www.stroke.org  SUMMARY  You can prevent a stroke by eating healthy, exercising, not smoking, limiting alcohol intake, and managing  any medical conditions you may have.  Do not use any products that contain nicotine or tobacco, such as cigarettes and e-cigarettes. If you need  help quitting, ask your health care provider. It may also be helpful to avoid exposure to secondhand smoke.  Remember BEFAST for warning signs of stroke. Get help right away if you or a loved one has any of these  signs.  ADDITIONAL NOTES AND INSTRUCTIONS  Please follow up with your Primary MD in 24-48 hr.  Seek immediate medical care for any new/worsening signs or symptoms. Please follow-up with stroke neurologist Dr. Mae within the next 1 week.  Please call to schedule an appointment.    Francisco Mae  Neurology  [address] [facility], [address]  [place] [address]  Phone: [phone]  Follow Up Time: 4-6 Days    Additionally you should follow-up with cardiologist Dr. Cruz within the next 1 week regarding your echocardiogram that was abnormal in the emergency department.  You should likely have further outpatient cardiac testing regarding this so please call to schedule appointment.    Tobi Savage  Cardiology  [address] [address] [place] [id number]  Phone: [phone]  Follow Up Time: 7-10 Days    Take aspirin 81 mg daily and Plavix 75 mg daily.  After 3 weeks you will stop the Plavix and continue the aspirin 81 mg alone.    Stop your rosuvastatin and start atorvastatin 80 mg daily.  Discussed this with neurologist at your appointment.    Return to the Emergency Department immediately if you develop any new/worsening symptoms including but not limited to speech/visual changes, numbness/tingling, weakness, dizziness, difficulty walking, chest pain, shortness of breath or any other concerns.  ____________________________________________________________________________________________________________________    Stroke Prevention  Some medical conditions and behaviors are associated with a higher chance of having a stroke. You can help prevent a stroke by making nutrition, lifestyle, and other changes, including managing any medical conditions you may have.  WHAT NUTRITION CHANGES CAN BE MADE?  Eat healthy foods. You can do this by:  · Choosing foods high in fiber, such as fresh fruits and vegetables and whole grains.  · Eating at least 5 or more servings of fruits and vegetables a day. Try to fill half of your plate at each meal with fruits and vegetables.  · Choosing lean protein foods, such as lean cuts of meat, poultry without skin, fish, tofu, beans, and nuts.  · Eating low-fat dairy products.  · Avoiding foods that are high in salt (sodium). This can help lower blood pressure.  · Avoiding foods that have saturated fat, trans fat, and cholesterol. This can help prevent high cholesterol.  · Avoiding processed and premade foods.  Follow your health care provider's specific guidelines for losing weight, controlling high blood pressure (hypertension), lowering high cholesterol, and managing diabetes. These may include:  · Reducing your daily calorie intake.  · Limiting your daily sodium intake to 1,500 milligrams (mg).  · Using only healthy fats for cooking, such as olive oil, canola oil, or sunflower oil.  · Counting your daily carbohydrate intake.  WHAT LIFESTYLE CHANGES CAN BE MADE?  Maintain a healthy weight. Talk to your health care provider about your ideal weight.  Get at least 30 minutes of moderate physical activity at least 5 days a week. Moderate activity includes brisk walking, biking, and swimming.  Do not use any products that contain nicotine or tobacco, such as cigarettes and e-cigarettes. If you need  help quitting, ask your health care provider. It may also be helpful to avoid exposure to secondhand smoke.  Limit alcohol intake to no more than 1 drink a day for nonpregnant women and 2 drinks a day for men. One  drink equals 12 oz of beer, 5 oz of wine, or 1½ oz of hard liquor.  Stop any illegal drug use.      Avoid taking birth control pills. Talk to your health care provider about the risks of taking birth control pills if:  · You are over 35 years old.  · You smoke.  · You get migraines.  · You have ever had a blood clot.  WHAT OTHER CHANGES CAN BE MADE?  Manage your cholesterol levels.  · Eating a healthy diet is important for preventing high cholesterol. If cholesterol cannot be managed  through diet alone, you may also need to take medicines.  · Take any prescribed medicines to control your cholesterol as told by your health care provider.  Manage your diabetes.  · Eating a healthy diet and exercising regularly are important parts of managing your blood sugar. If your  blood sugar cannot be managed through diet and exercise, you may need to take medicines.  · Take any prescribed medicines to control your diabetes as told by your health care provider.  Control your hypertension.  · To reduce your risk of stroke, try to keep your blood pressure below 130/80.  · Eating a healthy diet and exercising regularly are an important part of controlling your blood pressure. If  your blood pressure cannot be managed through diet and exercise, you may need to take medicines.  · Take any prescribed medicines to control hypertension as told by your health care provider.  · Ask your health care provider if you should monitor your blood pressure at home.  · Have your blood pressure checked every year, even if your blood pressure is normal. Blood pressure  increases with age and some medical conditions.  Get evaluated for sleep disorders (sleep apnea). Talk to your health care provider about getting a sleep evaluation if you snore a lot or have excessive sleepiness.  Take over-the-counter and prescription medicines only as told by your health care provider. Aspirin or blood thinners (antiplatelets or anticoagulants) may be recommended to reduce your risk of forming blood clots that can lead to stroke.  Make sure that any other medical conditions you have, such as atrial fibrillation or atherosclerosis, are  managed.  WHAT ARE THE WARNING SIGNS OF A STROKE?  The warning signs of a stroke can be easily remembered as BEFAST.  B is for balance. Signs include:  · Dizziness.  · Loss of balance or coordination.  · Sudden trouble walking.  E is for eyes. Signs include:  · A sudden change in vision.  · Trouble seeing.  F is for face. Signs include:  · Sudden weakness or numbness of the face.  · The face or eyelid drooping to one side.  A is for arms. Signs include:  · Sudden weakness or numbness of the arm, usually on one side of the body.  S is for speech. Signs include:  · Trouble speaking (aphasia).  · Trouble understanding.  T is for time.      · These symptoms may represent a serious problem that is an emergency. Do not wait to see if the symptoms will go away. Get medical help right away. Call your local emergency services (911 in the [place] Do not drive yourself to the hospital.  Other signs of stroke may include:  · A sudden, severe headache with no known cause.  · Nausea or vomiting.  · Seizure.    WHERE TO FIND MORE INFORMATION  For more information, visit:  American Stroke Association: www.strokeassociation.org  National Stroke Association: www.stroke.org  SUMMARY  You can prevent a stroke by eating healthy, exercising, not smoking, limiting alcohol intake, and managing  any medical conditions you may have.  Do not use any products that contain nicotine or tobacco, such as cigarettes and e-cigarettes. If you need  help quitting, ask your health care provider. It may also be helpful to avoid exposure to secondhand smoke.  Remember BEFAST for warning signs of stroke. Get help right away if you or a loved one has any of these  signs.  ADDITIONAL NOTES AND INSTRUCTIONS  Please follow up with your Primary MD in 24-48 hr.  Seek immediate medical care for any new/worsening signs or symptoms.

## 2025-01-26 NOTE — ED CDU PROVIDER DISPOSITION NOTE - CARE PROVIDER_API CALL
Francisco Mae  Neurology  [address] [facility], [address]  [place] [address]  Phone: [phone]  Fax: [phone]  Follow Up Time: 4-6 Days    Tobi Savage  Cardiology  [address] [place] [address]  Phone: [phone]  Follow Up Time: 7-10 Days

## 2025-01-26 NOTE — ED PROVIDER NOTE - NEUROLOGICAL, MLM
Alert and oriented, no focal deficits, no motor or sensory deficits.  Ambulatory without difficulty. Alert and oriented,  + R-sided facial weakness (bells palsy), no other focal deficits.  Ambulatory without difficulty.

## 2025-01-26 NOTE — CONSULT NOTE ADULT - ASSESSMENT
ASSESSMENT   59 R-H F with h/o HTN, pre-DM and Right facial [place] Palsy presented to the ED with LUE numbness and "head pressure." Symptoms first started at 1400 1/25/2025 transient, lasted several minutes but extinguished. She went to bed and woke up at her baseline at 1300 1/26 while she was outside having lunch developed another episode of LUE numbness that persisted. Decided to come to the ED. CODE STROKE called at 1326. mRS 0. NIHSS 1. CT head non con demonstrated no hemorrhage nor mass effect. CT angio demonstrated no LVO. Not candidate for tenecteplase nor thrombectomy because low NIHSS.     IMPRESSION   Overall stable neurologically. LUE numbness, improving, localized to right cerebral dysfunction i/s/o ischemic stroke of unknown mechanism.     RECOMMENDATION   [] CDU   [] Aspirin 81mg daily + Plavix 75 mg daily. Can load with plavix 300mg x1 then 75mg daily. Would continue with aspirin 81mg and Plavix 75mg for 3 weeks followed by aspirin 81 alone  [] Atorvastatin 80 mg daily titrated per LDL < 70  [] HgbA1C, fasting lipid panel, CBC, CMP, coag panel, troponin  [] MRI brain w/o con  [] TTE w/ bubble study  [] telemetry to check for arrhythmia, EKG, will discuss loop recorder    - Tight glucose control (long-term goal HgbA1c < 6%)  - Stroke education and counseling  - Neuro-checks and VS q4h  - Permissive HTN up to 220/120 for 24-48h from symptom onset  - Dysphagia screen. If fails, speech/swallow eval  - aspiration, fall precautions  - STAT CT head non-contrast for change in neuro exam.   - PT/ OT / DVT ppx per primary team     Discussed with stroke fellow  Viet Ashton     and under supervision of attending   Yoselin Longoria    regarding decision against candidacy for tenecteplase/ thrombectomy. Will be formally staffed on morning rounds with attending. Recommendations will be complete once signed by attending.   ASSESSMENT   59 R-H F with h/o HTN, pre-DM and Right facial [place] Palsy presented to the ED with LUE numbness and "head pressure." Symptoms first started at 1400 1/25/2025 transient, lasted several minutes but extinguished. She went to bed and woke up at her baseline at 1300 1/26 while she was outside having lunch developed another episode of LUE numbness that persisted. Decided to come to the ED. CODE STROKE called at 1326. mRS 0. NIHSS 1. CT head non con demonstrated no hemorrhage nor mass effect. CT angio demonstrated no LVO. Not candidate for tenecteplase nor thrombectomy because low NIHSS.     IMPRESSION   Overall stable neurologically. 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     RECOMMENDATION   [] CDU   [] Aspirin 81mg daily + Plavix 75 mg daily. Can load with plavix 300mg x1 then 75mg daily. Would continue with aspirin 81mg and Plavix 75mg for 3 weeks followed by aspirin 81 alone  [] Atorvastatin 80 mg daily titrated per LDL < 70  [] HgbA1C, fasting lipid panel, CBC, CMP, coag panel, troponin  [] MRI brain w/o con  [] TTE w/ bubble study  [] telemetry to check for arrhythmia, EKG, will discuss loop recorder    - Tight glucose control (long-term goal HgbA1c < 6%)  - Stroke education and counseling  - Neuro-checks and VS q4h  - Permissive HTN up to 220/120 for 24-48h from symptom onset  - Dysphagia screen. If fails, speech/swallow eval  - aspiration, fall precautions  - STAT CT head non-contrast for change in neuro exam.   - PT/ OT / DVT ppx per primary team     Discussed with stroke fellow  Viet Ashton     and under supervision of attending   Yoselin Longoria    regarding decision against candidacy for tenecteplase/ thrombectomy. Will be formally staffed on morning rounds with attending. Recommendations will be complete once signed by attending.

## 2025-01-26 NOTE — ED CDU PROVIDER INITIAL DAY NOTE - PROGRESS NOTE DETAILS
CDU PROGRESS NOTE ALIDA WHITE: Received pt at 1900 sign-out. Case/plan reviewed. Pt resting in stretcher in NAD. VSS. Pt is aox3, speaking coherently, chronic right side Facial droop, ambulatory around unit with steady gait. S1 S2 noted, RRR, lungs CTA b/l, BS x4 with soft, nontender abdomen. Pt without complaints, reports numbness has resolved. Neuro recs appreciated, will continue to monitor overnight. Pending MRI/TTE in am.

## 2025-01-26 NOTE — ED CDU PROVIDER DISPOSITION NOTE - CLINICAL COURSE
60 y/o female htn, hld, bells palsy with chronic right facial droop presents to the ED for evaluation of resolved LUE numbness. patient with negative CT/CTA. patient placed in CDU for mri, neuro checks. 58 y/o female htn, hld, bells palsy with chronic right facial droop presents to the ED for evaluation of resolved LUE numbness. patient with negative CT/CTA. patient placed in CDU for mri, neuro checks. No acute neurologic changes in CDU.  MRI resulted with white matter lesions that were nonspecific with differential including early manifestation of ischemic white matter disease versus migraine disease.  Patient was reevaluated by stroke neurologist Dr. Mae who advised patient can be discharged home from neurology standpoint, recommended aspirin and Plavix on discharge.  Echocardiogram was also performed which showed some regional wall motion abnormalities for which cardiologist Dr. Savage was consulted, advised could be discharged home from cardiology standpoint with outpatient follow-up as well.  Case discussed with ED attending Dr. Steinberg, patient stable at discharge.

## 2025-01-26 NOTE — STROKE CODE NOTE - NIH STROKE SCALE: 1C. LOC COMMANDS, QM
[facility] pharmacy at [address] in [place].  Okay to send valacyclovir?  If so, what dosage.  Please advise.    (0) Performs both tasks correctly

## 2025-01-26 NOTE — ED PROVIDER NOTE - ATTENDING CONTRIBUTION TO CARE
Patient seen with PA   Chief Complaint:    - Numbness in left hand, traveling up arm, associated with blurry vision.   HPI:    - Anna Block, a 59-year-old female, presented with sudden onset of numbness originating from the left hand traveling to her left arm. symptoms started at approximately 1 p.m and lasted for 30 minutes. Along with the numbness, she reported experiencing blurry vision. These symptoms have since resolved, and only slight numbness is present. She also mentioned a pounding in her head, however clarified that it was not a headache. She denies having chest pressure and migraines.   Past Medical History:    -  hypertension    -  borderline diabetes    -  history of small stroke     -  Bell's Palsy    -  high cholesterol   Medications:    -  that the patient regularly takes Aspirin and blood pressure medication for her hypertension   Review of Systems:    -  Neurological complaints of numbness in the left hand traveling up the arm, visual disturbances and aggravation of symptoms with movement. No complaint of chest pain. No history of similar symptoms.   Physical Examination:    -  Patient was asked to perform various movements including bending the knee and walking heel-to-toe without any reported issues. cn 2-12 grossly intact, rt sided hypomotility of cheek, str 5/5 bilaterally ue and le, intact sensory bilateral ue and le   Labs and Studies:    -  Initial testing plan includes CBC, PT/INR, CT of the head, CTA head and neck. The patient will be evaluated by neurology for further recommendations.   Medical Decision Making:    -  Based on the sudden onset and resolution of the patient's symptoms, differential diagnoses include transient ischemic attack or a complex migraine. Due to her past medical history of a minor stroke, a transient ischemic attack needs to be considered and ruled out. The decision to obtain a CT and CTA of the head and neck is to rule out acute pathology like any ischemic or hemorrhagic changes. Patient's management will further be decided based on the reports of these investigations and recommendations from the neurology team.   Impression:    -  The patient is likely experiencing symptoms of a complex migraine or less likely transient ischemic attack. Awaiting results of imaging studies and blood work for confirmation. Medically, she will be evaluated by neurology for further recommendations.

## 2025-01-26 NOTE — ED CDU PROVIDER INITIAL DAY NOTE - OBJECTIVE STATEMENT
58yo F with PMH of HTN, HLD, CVA, [place] palsy, preDM, presenting as code stroke for LUE numbness. Reports today at 1300 was at lunch when she felt numbness spreading from her left hand up her arm. Reports she noticed some associated blurry vision at the time and "head throbbing", all symptoms resolved within 30 minutes. Reports similar episode yesterday of LUE numbness and noticed her BP was elevated at the time. No associated dizziness, weakness, double vision, n/v, CP, SOB. Does not have hx of migraines. Denies fever/chills.

## 2025-01-26 NOTE — ED PROVIDER NOTE - OBJECTIVE STATEMENT
60yo F with PMH of HTN, HLD, CVA, [place] palsy, preDM, presenting as code stroke for LUE numbness. Reports today at 1300 was at lunch when she felt numbness spreading from her left hand up her arm. Reports she noticed some associated blurry vision at the time and "head throbbing", all symptoms resolved within 30 minutes. Reports similar episode yesterday of LUE numbness and noticed her BP was elevated at the time. No associated dizziness, weakness, double vision, n/v, CP, SOB. Does not have hx of migraines. 60yo F with PMH of HTN, HLD, CVA, [place] palsy, preDM, presenting as code stroke for LUE numbness. Reports today at 1300 was at lunch when she felt numbness spreading from her left hand up her arm. Reports she noticed some associated blurry vision at the time and "head throbbing", all symptoms resolved within 30 minutes. Reports similar episode yesterday of LUE numbness and noticed her BP was elevated at the time. No associated dizziness, weakness, double vision, n/v, CP, SOB. Does not have hx of migraines. Denies fever/chills.

## 2025-01-27 ENCOUNTER — RESULT REVIEW (OUTPATIENT)
Age: 60
End: 2025-01-27

## 2025-01-27 VITALS
TEMPERATURE: 98 F | HEART RATE: 82 BPM | DIASTOLIC BLOOD PRESSURE: 74 MMHG | SYSTOLIC BLOOD PRESSURE: 129 MMHG | RESPIRATION RATE: 18 BRPM | OXYGEN SATURATION: 98 %

## 2025-01-27 LAB
A1C WITH ESTIMATED AVERAGE GLUCOSE RESULT: 6.1 % — HIGH (ref 4–5.6)
CHOLEST SERPL-MCNC: 134 MG/DL — SIGNIFICANT CHANGE UP
ESTIMATED AVERAGE GLUCOSE: 128 MG/DL — HIGH (ref 68–114)
HDLC SERPL-MCNC: 34 MG/DL — LOW
LIPID PNL WITH DIRECT LDL SERPL: 86 MG/DL — SIGNIFICANT CHANGE UP
NON HDL CHOLESTEROL: 99 MG/DL — SIGNIFICANT CHANGE UP
TRIGL SERPL-MCNC: 62 MG/DL — SIGNIFICANT CHANGE UP

## 2025-01-27 PROCEDURE — 84295 ASSAY OF SERUM SODIUM: CPT

## 2025-01-27 PROCEDURE — 36000 PLACE NEEDLE IN VEIN: CPT | Mod: XU

## 2025-01-27 PROCEDURE — 99238 HOSP IP/OBS DSCHRG MGMT 30/<: CPT

## 2025-01-27 PROCEDURE — 93356 MYOCRD STRAIN IMG SPCKL TRCK: CPT

## 2025-01-27 PROCEDURE — 82435 ASSAY OF BLOOD CHLORIDE: CPT

## 2025-01-27 PROCEDURE — 84132 ASSAY OF SERUM POTASSIUM: CPT

## 2025-01-27 PROCEDURE — 70498 CT ANGIOGRAPHY NECK: CPT | Mod: MC

## 2025-01-27 PROCEDURE — 82947 ASSAY GLUCOSE BLOOD QUANT: CPT

## 2025-01-27 PROCEDURE — 85025 COMPLETE CBC W/AUTO DIFF WBC: CPT

## 2025-01-27 PROCEDURE — 80053 COMPREHEN METABOLIC PANEL: CPT

## 2025-01-27 PROCEDURE — 85610 PROTHROMBIN TIME: CPT

## 2025-01-27 PROCEDURE — 70496 CT ANGIOGRAPHY HEAD: CPT | Mod: MC

## 2025-01-27 PROCEDURE — 80061 LIPID PANEL: CPT

## 2025-01-27 PROCEDURE — 93306 TTE W/DOPPLER COMPLETE: CPT

## 2025-01-27 PROCEDURE — 93005 ELECTROCARDIOGRAM TRACING: CPT

## 2025-01-27 PROCEDURE — 85014 HEMATOCRIT: CPT

## 2025-01-27 PROCEDURE — 82330 ASSAY OF CALCIUM: CPT

## 2025-01-27 PROCEDURE — 83036 HEMOGLOBIN GLYCOSYLATED A1C: CPT

## 2025-01-27 PROCEDURE — 70450 CT HEAD/BRAIN W/O DYE: CPT | Mod: MC

## 2025-01-27 PROCEDURE — 84484 ASSAY OF TROPONIN QUANT: CPT

## 2025-01-27 PROCEDURE — 93306 TTE W/DOPPLER COMPLETE: CPT | Mod: 26

## 2025-01-27 PROCEDURE — 82962 GLUCOSE BLOOD TEST: CPT

## 2025-01-27 PROCEDURE — 99291 CRITICAL CARE FIRST HOUR: CPT | Mod: 25

## 2025-01-27 PROCEDURE — 81001 URINALYSIS AUTO W/SCOPE: CPT

## 2025-01-27 PROCEDURE — 85730 THROMBOPLASTIN TIME PARTIAL: CPT

## 2025-01-27 PROCEDURE — 82803 BLOOD GASES ANY COMBINATION: CPT

## 2025-01-27 PROCEDURE — 85018 HEMOGLOBIN: CPT

## 2025-01-27 PROCEDURE — G0378: CPT

## 2025-01-27 PROCEDURE — 83605 ASSAY OF LACTIC ACID: CPT

## 2025-01-27 PROCEDURE — 70551 MRI BRAIN STEM W/O DYE: CPT | Mod: MC

## 2025-01-27 RX ORDER — ATORVASTATIN CALCIUM 40 MG/1
1 TABLET, FILM COATED ORAL
Qty: 30 | Refills: 0
Start: 2025-01-27 | End: 2025-02-25

## 2025-01-27 RX ORDER — CLOPIDOGREL BISULFATE 75 MG/1
1 TABLET, FILM COATED ORAL
Qty: 21 | Refills: 0
Start: 2025-01-27 | End: 2025-02-16

## 2025-01-27 RX ADMIN — Medication 81 MILLIGRAM(S): at 11:51

## 2025-01-27 RX ADMIN — POTASSIUM CHLORIDE 10 MILLIEQUIVALENT(S): 600 TABLET, FILM COATED, EXTENDED RELEASE ORAL at 11:52

## 2025-01-27 RX ADMIN — CLOPIDOGREL BISULFATE 75 MILLIGRAM(S): 75 TABLET, FILM COATED ORAL at 11:51

## 2025-01-27 NOTE — ED CDU PROVIDER SUBSEQUENT DAY NOTE - PROGRESS NOTE DETAILS
MRI resulted, shows nonspecific cerebral hemispheric white matter lesions.  Results reviewed by stroke neurologist Dr. Mae who recently evaluated patient at bedside and advised could be discharged home w/ ASA/plavix. TTE shows normal EF of 55% though regional wall motion abnormalities are present.  Discussed results with patient, no history of stents or CAD that she knows of.  Does not follow with cardiologist.  Will discuss with cardiologist here regarding echo report. - Ino Brady PA-C Patient seen at bedside with ED attending, MRI resulted as above and neurology recommendations as above.  Awaiting TTE result at this time. - Ino Brady PA-C Cardiologist Dr. Luke Svaage to see patient - Ino Brady PA-C Patient seen by cardiologist Dr. Savage who reviewed echo report, okay to discharge home from cardiology standpoint, feels patient will need stress at some point but given recent neurologic issues would not perform in hospital acutely, okay with discharge home and outpatient follow-up with him within the next 1-2 weeks.  Case discussed with ED attending kimberly Mnea with discharge home with outpatient neuro and cardiology follow-up.  All results, return precautions and follow-up information were discussed with patient at bedside who acknowledged understanding.  Stable for discharge. - Ino Brady PA-C Cardiologist Dr. Luke Savage called for consuly, states Dr. Tobi Savage will see patient. - KAPIL AvilaC

## 2025-01-27 NOTE — ED CDU PROVIDER SUBSEQUENT DAY NOTE - HISTORY
CDU PROGRESS NOTE ALIDA WHITE: Pt resting in stretcher in NAD. VSS. No events on tele. No interval change from prior exam. Pt is aox3, speaking coherently, chronic right side Facial droop, ambulatory around unit with steady gait, reports numbness has resolved. MRI completed, pending final read.

## 2025-01-27 NOTE — CONSULT NOTE ADULT - SUBJECTIVE AND OBJECTIVE BOX
Cardiovascular Disease Initial Evaluation  Date of service: 25 @ 13:24    CHIEF COMPLAINT: AMS    HISTORY OF PRESENT ILLNESS: Ms. Block is a  60yo F with PMH of HTN, HLD, CVA, [place] palsy, preDM, presenting as code stroke for LUE numbness. Patient was at lunch when she felt numbness spreading from her left hand up her arm. Reports she noticed some associated blurry vision at the time and "head throbbing", all symptoms resolved within 30 minutes. Reports similar episode yesterday of LUE numbness and noticed her BP was elevated at the time. No associated dizziness, weakness, double vision, n/v, CP, SOB. Does not have hx of migraines. Denies fever/chills.    Patient denies CAD history.       Allergies    No Known Allergies    Intolerances    	    MEDICATIONS:  aspirin  chewable 81 milliGRAM(s) Oral daily  clopidogrel Tablet 75 milliGRAM(s) Oral daily            rosuvastatin 40 milliGRAM(s) Oral at bedtime    potassium chloride    Tablet ER 10 milliEquivalent(s) Oral daily      PAST MEDICAL & SURGICAL HISTORY:  Hypertension      Postmenopausal bleeding      Polyp of corpus uteri      S/P  section            FAMILY HISTORY:  No pertinent family history in first degree relatives        SOCIAL HISTORY:    The patient is a nonsmoker       REVIEW OF SYSTEMS:  See HPI, otherwise complete 14 point review of systems negative    [x ] All others negative	  [ ] Unable to obtain    PHYSICAL EXAM:  T(C): 36.7 (25 @ 12:46), Max: 37 (25 @ 23:53)  HR: 82 (25 @ 12:46) (74 - 84)  BP: 129/74 (25 @ 12:46) (104/80 - 129/74)  RR: 18 (25 @ 12:46) (18 - 20)  SpO2: 98% (25 @ 12:46) (96% - 100%)  Wt(kg): --  I&O's Summary      Appearance: No Acute Distress; resting comfortably  HEENT:  Normal oral mucosa, PERRL, EOMI	  Cardiovascular: Normal S1 S2, No JVD, No murmurs/rubs/gallops  Respiratory: Normal respiratory effort; Lungs clear to auscultation bilaterally  Gastrointestinal:  Soft, Non-tender, + BS	  Skin: No rashes, No ecchymoses, No cyanosis	  Neurologic: Non-focal; no weakness  Extremities: No clubbing, cyanosis or edema  Vascular: Peripheral pulses palpable 2+ bilaterally  Psychiatry: A & O x 3, Mood & affect appropriate    Laboratory Data:	 	    CBC Full  -  ( 2025 13:32 )  WBC Count : 2.89 K/uL  Hemoglobin : 13.6 g/dL  Hematocrit : 42.8 %  Platelet Count - Automated : 246 K/uL  Mean Cell Volume : 86.1 fl  Mean Cell Hemoglobin : 27.4 pg  Mean Cell Hemoglobin Concentration : 31.8 g/dL  Auto Neutrophil # : 0.99 K/uL  Auto Lymphocyte # : 1.75 K/uL  Auto Monocyte # : 0.12 K/uL  Auto Eosinophil # : 0.02 K/uL  Auto Basophil # : 0.00 K/uL  Auto Neutrophil % : 34.2 %  Auto Lymphocyte % : 60.7 %  Auto Monocyte % : 4.3 %  Auto Eosinophil % : 0.8 %  Auto Basophil % : 0.0 %        141  |  103  |  7   ----------------------------<  150[H]  3.3[L]   |  27  |  0.67    Ca    9.8      2025 13:32    TPro  8.5[H]  /  Alb  4.3  /  TBili  0.7  /  DBili  x   /  AST  21  /  ALT  19  /  AlkPhos  111        proBNP:   Lipid Profile:   HgA1c:   TSH:       CARDIAC MARKERS: trop T <6            Interpretation of Telemetry:  sinus	    ECG: Sinus rhythm  RADIOLOGY:  OTHER: 	    PREVIOUS DIAGNOSTIC TESTING:    [x ] Echocardiogram:   1. Left ventricular systolic function is normal with an ejection fraction of 55 % by 3D. Regional wall motion abnormalities present.   2. There is borderline to mild hypokinesis of the basal inferolateral and mid inferolateral walls.   3. The right ventricle is not well visualized.   4. Aortic root at the sinuses of Valsalva is dilated, measuring 3.81 cm (indexed 2.10 cm/m²).  [ ] Catheterization:  [ ] Stress Test:  	    Assessment:  60yo F with PMH of HTN, HLD, CVA, [place] palsy, preDM, presenting as code stroke for LUE numbness    Plan of Care:    #Cardiomyopathy  - Echo shows normal LV function with WMA in the inferior walls  - Patient with no history of CAD  - Has a history of pericardial effusion years ago but has not followed up with cardiology since  - Patient currently being treated for CVA/TIA  - Continue anitplatelet therapy as per neurology  - Cardiac wise, patient denies chest pain and overall symptoms of numbness have resolved  - EKG shows sinus rhythm  - Plan for outpatient follow up for ischemic evaluation  - Will allow time for recovery from TIA/CVA  - Patient scheduled to follow up with me on 2025    #HTN  - Please resume home medications for HTN    #HLD  - Statin therapy    #ACP (advance care planning)-  Advanced care planning was discussed with the patient.  Risks, benefits and alternatives of medical treatment and procedures were discussed in detail and all questions were answered. 30 additional minutes spent addressing advance care plans.    Complex medical decision making in a patient with multiple co-morbidities.   77 minutes spent on total encounter; more than 50% of the visit was spent counseling and/or coordinating care by the attending physician.   	  Tobi Savage DO [facility]  Cardiovascular Diseases  [phone]    
**STROKE CODE CONSULT NOTE**    Last known well time/Time of onset of symptoms: 1300 2025    HPI: 59 R-H F with h/o HTN, pre-DM and Right facial [place] Palsy presented to the ED with LUE numbness and "head pressure." Symptoms first started at 1400 2025 transient, lasted several minutes but extinguished. She went to bed and woke up at her baseline at 1300  while she was outside having lunch developed another episode of LUE numbness that persisted. Described as starting in the tips of her fingers and migrating upwards to LUE. Also demonstrated a bifrontal pressure in her head but denied that it was combined with photo/phonophobia. Denied N/V. Decided to come to the ED. CODE STROKE called at 1326. mRS 0. NIHSS 1. CT head non con demonstrated no hemorrhage nor mass effect. CT angio demonstrated no LVO. Not candidate for tenecteplase nor thrombectomy because low NIHSS.     Of note, patient was seen in ED 18 with migratory throbbing headache with no focal deficits. Discharged with tylenol. Seen again several years later for [place] Palsy, received CT head which demonstrated chronic bilateral infarcts. Started on aspirin for which she continues to take.     PAST MEDICAL & SURGICAL HISTORY:  Hypertension      Postmenopausal bleeding      Polyp of corpus uteri      S/P  section            FAMILY HISTORY:      SOCIAL HISTORY:  Smoking Cessation: Discussed    ROS:  Neurological: As per HPI    MEDICATIONS  (STANDING):    MEDICATIONS  (PRN):      Allergies    No Known Allergies    Intolerances        Vital Signs Last 24 Hrs  T(C): 36.6 (2025 13:18), Max: 36.6 (2025 13:18)  T(F): 97.9 (2025 13:18), Max: 97.9 (2025 13:18)  HR: 90 (2025 13:18) (90 - 90)  BP: 179/109 (2025 13:18) (179/109 - 179/109)  BP(mean): --  RR: 18 (2025 13:18) (18 - 18)  SpO2: 100% (2025 13:18) (100% - 100%)    Parameters below as of 2025 13:18  Patient On (Oxygen Delivery Method): room air        PHYSICAL EXAM:  Constitutional: WDWN; NAD  Cardiovascular: RRR, no appreciable murmurs; no carotid bruits  Neurologic:  Mental status: Awake, alert and oriented x3.  Recent and remote memory intact. No dysarthria, no aphasia.  Fund of knowledge appropriate.    Cranial nerves: Pupils equally round and reactive to light, visual fields full, no nystagmus, extraocular muscles intact, V1 through V3 intact bilaterally and symmetric, face asymmetry on right, hearing intact to finger rub, palate elevation symmetric, tongue was midline, shoulder shrug strength bilaterally 5/5.    Motor:  Normal bulk and tone, strength 5/5 in bilateral upper and lower extremities.   strength 5/5.  Rapid alternating movements intact and symmetric.   Sensation: Intact to light touch but numb on LUE (improving)  No neglect.   Coordination: No dysmetria on finger-to-nose and heel-to-shin.  No clumsiness.  Reflexes: 2+ in upper and lower extremities, downgoing toes bilaterally  Gait: Narrow and steady. can tandem, walk on tip toes and heels. No ataxia.  Romberg negative    NIHSS: 3 (known chronic right side Facial droop +2, LUE sensory changes)  mRS 0     Fingerstick Blood Glucose: CAPILLARY BLOOD GLUCOSE      POCT Blood Glucose.: 130 mg/dL (2025 13:23)       LABS:                        13.6   2.89  )-----------( 246      ( 2025 13:32 )             42.8                     RADIOLOGY & ADDITIONAL STUDIES:    IV-tenecteplase(Y/N):    N                               Bolus time:  Reason IV-tenecteplase not given: low NIHSS

## 2025-01-27 NOTE — ED CDU PROVIDER SUBSEQUENT DAY NOTE - CLINICAL SUMMARY MEDICAL DECISION MAKING FREE TEXT BOX
RGUJRAL 58yo F with PMH of HTN, HLD, CVA, [place] palsy, preDM, presented as code stroke for LUE numbness. Reports today at 1300 was at lunch when she felt numbness spreading from her left hand up her arm. Reports she noticed some associated blurry vision at the time and "head throbbing", all symptoms resolved within 30 minutes. Reports similar episode yesterday of LUE numbness and noticed her BP was elevated at the time. No associated dizziness, weakness, double vision, n/v, CP, SOB. Does not have hx of migraines. Denies fever/chills. Today patient states she feels much better and symptoms resolved. Pt is on  aspirin 81mg loaded with plavix 300mg. On exam, Patient is awake,alert,oriented x 3. Patient is well appearing and in no acute distress. Patient's chest is clear to ausculation, +s1s2. Abdomen is soft nd/nt +BS. Extremity with no swelling or calf tenderness. Neuro intact (with previous R facial bell's palsy). MRI results reviewed IMPRESSION:  Cerebral hemispheric white matter lesions are nonspecific.   In this age group the differential diagnosis is primarily between an   early manifestation of ischemic white matter disease that would be   advanced for age versus migraine disease. One or both etiologies could   contribute to this imaging appearance. Other less common etiologies   remain possible, with broad differential diagnosis including inflammatory   infectious and metabolic diseases. Overall ischemic etiology is favored.   The extent of this disease is moderate. There is maybe superimposed small   foci of remote infarction in the basal ganglia.    Neuro consult with Dr. Dexter reviewed. Continue patient on Plavix with outpatient follow up. Plan discussed with patient, ambulatory and feels well. Will follow up ECHO results.

## 2025-01-27 NOTE — ED ADULT NURSE REASSESSMENT NOTE - NS ED NURSE REASSESS COMMENT FT1
morning labs sent, pt received asleep in bed, easily arousable, denies c/o
pt returns from MRI
pt resting in bed, female visitor  at bedside, neuro exam wnl, strong, sensation intact, maex4, MRI head/TTE pending

## 2025-02-20 NOTE — H&P PST ADULT - GENERAL INFO COMMENT, PROFILE
You can access the FollowMyHealth Patient Portal offered by [facility] by registering at the following website: [email or web address]. By joining [employer]’s FollowMyHealth portal, you will also be able to view your health information using other applications (apps) compatible with our system.
reading glasses